# Patient Record
Sex: MALE | Race: WHITE | HISPANIC OR LATINO | Employment: OTHER | URBAN - METROPOLITAN AREA
[De-identification: names, ages, dates, MRNs, and addresses within clinical notes are randomized per-mention and may not be internally consistent; named-entity substitution may affect disease eponyms.]

---

## 2017-05-24 ENCOUNTER — TRANSCRIBE ORDERS (OUTPATIENT)
Dept: LAB | Facility: CLINIC | Age: 42
End: 2017-05-24

## 2017-05-24 ENCOUNTER — APPOINTMENT (OUTPATIENT)
Dept: LAB | Facility: CLINIC | Age: 42
End: 2017-05-24
Payer: MEDICARE

## 2017-05-24 ENCOUNTER — ALLSCRIPTS OFFICE VISIT (OUTPATIENT)
Dept: OTHER | Facility: OTHER | Age: 42
End: 2017-05-24

## 2017-05-24 DIAGNOSIS — Z13.6 ENCOUNTER FOR SCREENING FOR CARDIOVASCULAR DISORDERS: ICD-10-CM

## 2017-05-24 DIAGNOSIS — G62.9 POLYNEUROPATHY: ICD-10-CM

## 2017-05-24 DIAGNOSIS — K62.5 HEMORRHAGE OF ANUS AND RECTUM: ICD-10-CM

## 2017-05-24 DIAGNOSIS — R73.09 OTHER ABNORMAL GLUCOSE: ICD-10-CM

## 2017-05-24 DIAGNOSIS — K92.1 MELENA: ICD-10-CM

## 2017-05-24 LAB
ALBUMIN SERPL BCP-MCNC: 3.8 G/DL (ref 3.5–5)
ALP SERPL-CCNC: 78 U/L (ref 46–116)
ALT SERPL W P-5'-P-CCNC: 26 U/L (ref 12–78)
ANION GAP SERPL CALCULATED.3IONS-SCNC: 6 MMOL/L (ref 4–13)
AST SERPL W P-5'-P-CCNC: 18 U/L (ref 5–45)
BASOPHILS # BLD AUTO: 0.04 THOUSANDS/ΜL (ref 0–0.1)
BASOPHILS NFR BLD AUTO: 1 % (ref 0–1)
BILIRUB SERPL-MCNC: 0.48 MG/DL (ref 0.2–1)
BUN SERPL-MCNC: 12 MG/DL (ref 5–25)
CALCIUM SERPL-MCNC: 9.2 MG/DL (ref 8.3–10.1)
CHLORIDE SERPL-SCNC: 106 MMOL/L (ref 100–108)
CHOLEST SERPL-MCNC: 148 MG/DL (ref 50–200)
CO2 SERPL-SCNC: 30 MMOL/L (ref 21–32)
CREAT SERPL-MCNC: 1.2 MG/DL (ref 0.6–1.3)
EOSINOPHIL # BLD AUTO: 0.17 THOUSAND/ΜL (ref 0–0.61)
EOSINOPHIL NFR BLD AUTO: 2 % (ref 0–6)
ERYTHROCYTE [DISTWIDTH] IN BLOOD BY AUTOMATED COUNT: 13.7 % (ref 11.6–15.1)
EST. AVERAGE GLUCOSE BLD GHB EST-MCNC: 114 MG/DL
FERRITIN SERPL-MCNC: 68 NG/ML (ref 8–388)
GFR SERPL CREATININE-BSD FRML MDRD: >60 ML/MIN/1.73SQ M
GLUCOSE SERPL-MCNC: 74 MG/DL (ref 65–140)
HBA1C MFR BLD: 5.6 % (ref 4.2–6.3)
HCT VFR BLD AUTO: 44.5 % (ref 36.5–49.3)
HDLC SERPL-MCNC: 45 MG/DL (ref 40–60)
HGB BLD-MCNC: 15.2 G/DL (ref 12–17)
IRON SATN MFR SERPL: 30 %
IRON SERPL-MCNC: 96 UG/DL (ref 65–175)
LDLC SERPL CALC-MCNC: 80 MG/DL (ref 0–100)
LYMPHOCYTES # BLD AUTO: 2.24 THOUSANDS/ΜL (ref 0.6–4.47)
LYMPHOCYTES NFR BLD AUTO: 27 % (ref 14–44)
MCH RBC QN AUTO: 31.5 PG (ref 26.8–34.3)
MCHC RBC AUTO-ENTMCNC: 34.2 G/DL (ref 31.4–37.4)
MCV RBC AUTO: 92 FL (ref 82–98)
MONOCYTES # BLD AUTO: 0.57 THOUSAND/ΜL (ref 0.17–1.22)
MONOCYTES NFR BLD AUTO: 7 % (ref 4–12)
NEUTROPHILS # BLD AUTO: 5.38 THOUSANDS/ΜL (ref 1.85–7.62)
NEUTS SEG NFR BLD AUTO: 63 % (ref 43–75)
NRBC BLD AUTO-RTO: 0 /100 WBCS
PLATELET # BLD AUTO: 256 THOUSANDS/UL (ref 149–390)
PMV BLD AUTO: 11.9 FL (ref 8.9–12.7)
POTASSIUM SERPL-SCNC: 3.9 MMOL/L (ref 3.5–5.3)
PROT SERPL-MCNC: 7.2 G/DL (ref 6.4–8.2)
RBC # BLD AUTO: 4.83 MILLION/UL (ref 3.88–5.62)
SODIUM SERPL-SCNC: 142 MMOL/L (ref 136–145)
TIBC SERPL-MCNC: 319 UG/DL (ref 250–450)
TRIGL SERPL-MCNC: 114 MG/DL
TSH SERPL DL<=0.05 MIU/L-ACNC: 2.01 UIU/ML (ref 0.36–3.74)
VIT B12 SERPL-MCNC: 335 PG/ML (ref 100–900)
WBC # BLD AUTO: 8.42 THOUSAND/UL (ref 4.31–10.16)

## 2017-05-24 PROCEDURE — 85025 COMPLETE CBC W/AUTO DIFF WBC: CPT

## 2017-05-24 PROCEDURE — 80061 LIPID PANEL: CPT

## 2017-05-24 PROCEDURE — 80053 COMPREHEN METABOLIC PANEL: CPT

## 2017-05-24 PROCEDURE — 82728 ASSAY OF FERRITIN: CPT

## 2017-05-24 PROCEDURE — 83550 IRON BINDING TEST: CPT

## 2017-05-24 PROCEDURE — 82607 VITAMIN B-12: CPT

## 2017-05-24 PROCEDURE — 83540 ASSAY OF IRON: CPT

## 2017-05-24 PROCEDURE — 83036 HEMOGLOBIN GLYCOSYLATED A1C: CPT

## 2017-05-24 PROCEDURE — 84443 ASSAY THYROID STIM HORMONE: CPT

## 2017-05-24 PROCEDURE — 36415 COLL VENOUS BLD VENIPUNCTURE: CPT

## 2017-06-23 ENCOUNTER — ALLSCRIPTS OFFICE VISIT (OUTPATIENT)
Dept: OTHER | Facility: OTHER | Age: 42
End: 2017-06-23

## 2017-06-30 ENCOUNTER — TRANSCRIBE ORDERS (OUTPATIENT)
Dept: LAB | Facility: CLINIC | Age: 42
End: 2017-06-30

## 2017-06-30 ENCOUNTER — APPOINTMENT (OUTPATIENT)
Dept: LAB | Facility: CLINIC | Age: 42
End: 2017-06-30
Payer: MEDICARE

## 2017-06-30 DIAGNOSIS — K92.1 MELENA: ICD-10-CM

## 2017-06-30 LAB — HEMOCCULT STL QL IA: NEGATIVE

## 2017-06-30 PROCEDURE — G0328 FECAL BLOOD SCRN IMMUNOASSAY: HCPCS

## 2017-07-10 ENCOUNTER — GENERIC CONVERSION - ENCOUNTER (OUTPATIENT)
Dept: OTHER | Facility: OTHER | Age: 42
End: 2017-07-10

## 2017-10-24 ENCOUNTER — ALLSCRIPTS OFFICE VISIT (OUTPATIENT)
Dept: OTHER | Facility: OTHER | Age: 42
End: 2017-10-24

## 2017-10-24 DIAGNOSIS — R20.2 PARESTHESIA OF SKIN: ICD-10-CM

## 2017-10-24 DIAGNOSIS — R20.0 ANESTHESIA OF SKIN: ICD-10-CM

## 2017-10-24 DIAGNOSIS — M25.512 PAIN IN LEFT SHOULDER: ICD-10-CM

## 2017-10-24 DIAGNOSIS — R29.898 OTHER SYMPTOMS AND SIGNS INVOLVING THE MUSCULOSKELETAL SYSTEM: ICD-10-CM

## 2017-10-24 DIAGNOSIS — G62.9 POLYNEUROPATHY: ICD-10-CM

## 2017-10-26 NOTE — PROGRESS NOTES
Assessment  1  Arthralgia of left acromioclavicular joint (719 41) (M25 512)   2  Hematochezia (578 1) (K92 1)   3  Neuropathy, peripheral (356 9) (G62 9)    Plan  Arthralgia of left acromioclavicular joint    · Ibuprofen 800 MG Oral Tablet; take 1 tablet by mouth three times a day if needed   · Pantoprazole Sodium 40 MG Oral Tablet Delayed Release; take one tablet by mouth twice daily   · (1) C-REACTIVE PROTEIN; Status:Active; Requested [id number];    · (1) URIC ACID; Status:Active; Requested [id number];    · * XR SHOULDER 2+ VIEW LEFT; Status:Active; Requested :[id number];    · 1 - Naila SMILEY, Alen Favre (Orthopedic Surgery) Co-Management  *  Status: Active  Requested for:  [id number]  Care Summary provided  : Yes  Neuropathy, peripheral    · (1) BASIC METABOLIC PROFILE; Status:Active; Requested [id number];    · (1) CBC/PLT/DIFF; Status:Active; Requested [id number];     Discussion/Summary  Discussion Summary:   We'll try nonsteroidals orthopedic referral and workup  He'll follow-up here pending the results of his testing or if his symptoms worsen  Medication SE Review and Pt Understands Tx: Possible side effects of new medications were reviewed with the patient/guardian today  The treatment plan was reviewed with the patient/guardian  The patient/guardian understands and agrees with the treatment plan      Chief Complaint  Chief Complaint Free Text Note Form: Left shoulder pain      History of Present Illness  HPI: He has had left shoulder pain for 3 or 4 months  Describes constant aching burning pain in the left trapezius area  Sometimes sharp shooting pain on the top of his left shoulder any movement hurts  It hurts to try to sleep he has not tried any over-the-counter medications  He denies weakness of his arm or numbness but sometimes the pain is severe that he feels aching in his left forearm he has no pain in the neck or headache no shortness of breath palpitations or dizziness   He has no history of any injury to the left shoulder  He is disabled due to electrocution he has some neurologic symptoms of pain in his arms and legs on the right as well as some cognitive problems  Peripheral Neuropathy (Brief): The patient is being seen for a routine clinic follow-up of peripheral neuropathy  Symptoms:  numbness,-- tingling-- and-- upper extremity pain  The patient is currently experiencing symptoms  No associated symptoms are reported  Shoulder Pain: The patient is being seen for an initial evaluation of shoulder pain  Symptoms:  shoulder pain-- and-- decreased range of motion at the shoulder, but-- no localized bruising,-- no localized swelling,-- no localized redness,-- no localized warmth-- and-- no localized weakness  No associated symptoms are reported  Review of Systems  Complete-Male:   Constitutional: No fever or chills, feels well, no tiredness, no recent weight gain or weight loss  Eyes: No complaints of eye pain, no red eyes, no discharge from eyes, no itchy eyes  ENT: no complaints of earache, no hearing loss, no nosebleeds, no nasal discharge, no sore throat, no hoarseness  Cardiovascular: No complaints of slow heart rate, no fast heart rate, no chest pain, no palpitations, no leg claudication, no lower extremity  Respiratory: No complaints of shortness of breath, no wheezing, no cough, no SOB on exertion, no orthopnea or PND  Gastrointestinal: No complaints of abdominal pain, no constipation, no nausea or vomiting, no diarrhea or bloody stools  Genitourinary: No complaints of dysuria, no incontinence, no hesitancy, no nocturia, no genital lesion, no testicular pain  Musculoskeletal: as noted in HPI  Integumentary: No complaints of skin rash or skin lesions, no itching, no skin wound, no dry skin  Neurological: as noted in HPI  Psychiatric: Is not suicidal, no sleep disturbances, no anxiety or depression, no change in personality, no emotional problems  Endocrine: No complaints of proptosis, no hot flashes, no muscle weakness, no erectile dysfunction, no deepening of the voice, no feelings of weakness  Hematologic/Lymphatic: No complaints of swollen glands, no swollen glands in the neck, does not bleed easily, no easy bruising  ROS Reviewed:   ROS reviewed  Active Problems  1  BMI 28 0-28 9,adult (V85 24) (Z68 28)   2  Bright red blood per rectum (569 3) (K62 5)   3  Encounter for screening for cardiovascular disorders (V81 2) (Z13 6)   4  Epigastric pain (789 06) (R10 13)   5  Helicobacter pylori (H  pylori) infection (041 86) (A04 8)   6  Hematochezia (578 1) (K92 1)   7  Neuropathy, peripheral (356 9) (G62 9)    Past Medical History  1  Denied: History of depression   2  Denied: History of substance abuse  Active Problems And Past Medical History Reviewed: The active problems and past medical history were reviewed and updated today  Surgical History  1  History of Foot Repair   2  History of Hand Surgery   3  History of Wrist Surgery  Surgical History Reviewed: The surgical history was reviewed and updated today  Family History  Mother    1  Family history of diabetes mellitus (V18 0) (Z83 3)   2  Denied: Family history of mental disorder   3  Denied: Family history of substance abuse  Father    4  Family history of arthritis (V17 7) (Z82 61)   5  Denied: Family history of mental disorder   6  Denied: Family history of substance abuse  Maternal Grandmother    7  Family history of malignant neoplasm (V16 9) (Z80 9)  Family History Reviewed: The family history was reviewed and updated today  Social History   · Current smoker (305 1) (F17 200)  Social History Reviewed: The social history was reviewed and updated today  Current Meds   1  Clarithromycin 500 MG Oral Tablet; take 1 tablet twice daily for 14 days; Therapy: 92DVZ7348 to (Last Rx:49Jcn1638)  Requested for: 69Gjk7752 Ordered   2   MetroNIDAZOLE 500 MG Oral Tablet; TAKE 1 TABLET 3 TIMES DAILY UNTIL GONE;   Therapy: 08IJP0418 to (Evaluate:51Oew9805)  Requested for: 19CES8736; Last Rx:75Aeu1728   Ordered  Medication List Reviewed: The medication list was reviewed and updated today  Allergies  1  Penicillins  2  No Known Environmental Allergies   3  No Known Food Allergies    Vitals  Vital Signs    Recorded: 53IZE3442 05:40PM   Heart Rate 68   Systolic 095   Diastolic 74   Height 5 ft 8 in   Weight 192 lb 8 oz   BMI Calculated 29 27   BSA Calculated 2 01   O2 Saturation 98     Physical Exam    Constitutional   General appearance: No acute distress, well appearing and well nourished  Eyes   Conjunctiva and lids: No swelling, erythema, or discharge  Pupils and irises: Equal, round and reactive to light  Ears, Nose, Mouth, and Throat   External inspection of ears and nose: Normal     Otoscopic examination: Tympanic membrance translucent with normal light reflex  Canals patent without erythema  Nasal mucosa, septum, and turbinates: Normal without edema or erythema  Oropharynx: Normal with no erythema, edema, exudate or lesions  Pulmonary   Respiratory effort: No increased work of breathing or signs of respiratory distress  Auscultation of lungs: Clear to auscultation, equal breath sounds bilaterally, no wheezes, no rales, no rhonci  Cardiovascular   Palpation of heart: Normal PMI, no thrills  Auscultation of heart: Normal rate and rhythm, normal S1 and S2, without murmurs  Examination of extremities for edema and/or varicosities: Normal     Carotid pulses: Normal     Abdomen   Abdomen: Non-tender, no masses  Liver and spleen: No hepatomegaly or splenomegaly  Lymphatic   Palpation of lymph nodes in neck: No lymphadenopathy  Musculoskeletal   Gait and station: Normal     Digits and nails: Normal without clubbing or cyanosis      Inspection/palpation of joints, bones, and muscles: Abnormal  -- Marked tenderness before meals joint area on the left  Normal range of motion of his neck any movement of his trapezius shoulder blade her left shoulder causes him a lot of pain normal neurovascular function of his hand on the left  Skin   Skin and subcutaneous tissue: Normal without rashes or lesions  Neurologic   Cranial nerves: Cranial nerves 2-12 intact  Reflexes: 2+ and symmetric  Sensation: No sensory loss  Psychiatric   Orientation to person, place and time: Normal     Mood and affect: Normal          Health Management  Health Maintenance   COLONOSCOPY; every 10 years; Last 76EDS9479; Next Due: 69Yku0389;  Active    Signatures   Electronically signed by : Drew Fisher, [facility]; Oct 24 2017  7:18PM EST                       (Author)    Electronically signed by : Yonathan Pinto DO; Oct 25 2017  7:58AM EST                       (Author)

## 2017-11-09 ENCOUNTER — TRANSCRIBE ORDERS (OUTPATIENT)
Dept: ADMINISTRATIVE | Facility: HOSPITAL | Age: 42
End: 2017-11-09

## 2017-11-09 ENCOUNTER — HOSPITAL ENCOUNTER (OUTPATIENT)
Dept: RADIOLOGY | Facility: HOSPITAL | Age: 42
Discharge: HOME | End: 2017-11-09
Payer: MEDICARE

## 2017-11-09 DIAGNOSIS — M25.512 PAIN IN LEFT SHOULDER: ICD-10-CM

## 2017-11-09 PROCEDURE — 73030 X-RAY EXAM OF SHOULDER: CPT

## 2017-11-10 ENCOUNTER — APPOINTMENT (OUTPATIENT)
Dept: LAB | Facility: HOSPITAL | Age: 42
End: 2017-11-10
Payer: MEDICARE

## 2017-11-10 ENCOUNTER — TRANSCRIBE ORDERS (OUTPATIENT)
Dept: ADMINISTRATIVE | Facility: HOSPITAL | Age: 42
End: 2017-11-10

## 2017-11-10 DIAGNOSIS — M25.512 PAIN IN LEFT SHOULDER: ICD-10-CM

## 2017-11-10 DIAGNOSIS — G62.9 POLYNEUROPATHY: ICD-10-CM

## 2017-11-10 LAB
ANION GAP SERPL CALCULATED.3IONS-SCNC: 9 MMOL/L (ref 4–13)
BASOPHILS # BLD AUTO: 0.03 THOUSANDS/ΜL (ref 0–0.1)
BASOPHILS NFR BLD AUTO: 0 % (ref 0–1)
BUN SERPL-MCNC: 17 MG/DL (ref 5–25)
CALCIUM SERPL-MCNC: 9.5 MG/DL (ref 8.3–10.1)
CHLORIDE SERPL-SCNC: 104 MMOL/L (ref 100–108)
CO2 SERPL-SCNC: 29 MMOL/L (ref 21–32)
CREAT SERPL-MCNC: 1.19 MG/DL (ref 0.6–1.3)
CRP SERPL QL: 4.7 MG/L
EOSINOPHIL # BLD AUTO: 0.17 THOUSAND/ΜL (ref 0–0.61)
EOSINOPHIL NFR BLD AUTO: 3 % (ref 0–6)
ERYTHROCYTE [DISTWIDTH] IN BLOOD BY AUTOMATED COUNT: 13 % (ref 11.6–15.1)
GFR SERPL CREATININE-BSD FRML MDRD: 74 ML/MIN/1.73SQ M
GLUCOSE P FAST SERPL-MCNC: 97 MG/DL (ref 65–99)
HCT VFR BLD AUTO: 48.7 % (ref 36.5–49.3)
HGB BLD-MCNC: 16.5 G/DL (ref 12–17)
LYMPHOCYTES # BLD AUTO: 1.67 THOUSANDS/ΜL (ref 0.6–4.47)
LYMPHOCYTES NFR BLD AUTO: 25 % (ref 14–44)
MCH RBC QN AUTO: 30.7 PG (ref 26.8–34.3)
MCHC RBC AUTO-ENTMCNC: 33.9 G/DL (ref 31.4–37.4)
MCV RBC AUTO: 91 FL (ref 82–98)
MONOCYTES # BLD AUTO: 0.59 THOUSAND/ΜL (ref 0.17–1.22)
MONOCYTES NFR BLD AUTO: 9 % (ref 4–12)
NEUTROPHILS # BLD AUTO: 4.36 THOUSANDS/ΜL (ref 1.85–7.62)
NEUTS SEG NFR BLD AUTO: 64 % (ref 43–75)
NRBC BLD AUTO-RTO: 0 /100 WBCS
PLATELET # BLD AUTO: 247 THOUSANDS/UL (ref 149–390)
PMV BLD AUTO: 11.1 FL (ref 8.9–12.7)
POTASSIUM SERPL-SCNC: 4.1 MMOL/L (ref 3.5–5.3)
RBC # BLD AUTO: 5.37 MILLION/UL (ref 3.88–5.62)
SODIUM SERPL-SCNC: 142 MMOL/L (ref 136–145)
URATE SERPL-MCNC: 6 MG/DL (ref 4.2–8)
WBC # BLD AUTO: 6.83 THOUSAND/UL (ref 4.31–10.16)

## 2017-11-10 PROCEDURE — 85025 COMPLETE CBC W/AUTO DIFF WBC: CPT

## 2017-11-10 PROCEDURE — 84550 ASSAY OF BLOOD/URIC ACID: CPT

## 2017-11-10 PROCEDURE — 80048 BASIC METABOLIC PNL TOTAL CA: CPT

## 2017-11-10 PROCEDURE — 36415 COLL VENOUS BLD VENIPUNCTURE: CPT

## 2017-11-10 PROCEDURE — 86140 C-REACTIVE PROTEIN: CPT

## 2017-11-13 ENCOUNTER — TRANSCRIBE ORDERS (OUTPATIENT)
Dept: ADMINISTRATIVE | Facility: HOSPITAL | Age: 42
End: 2017-11-13

## 2017-11-13 ENCOUNTER — GENERIC CONVERSION - ENCOUNTER (OUTPATIENT)
Dept: OTHER | Facility: OTHER | Age: 42
End: 2017-11-13

## 2017-11-13 DIAGNOSIS — M25.512 LEFT SHOULDER PAIN, UNSPECIFIED CHRONICITY: Primary | ICD-10-CM

## 2017-11-18 ENCOUNTER — HOSPITAL ENCOUNTER (OUTPATIENT)
Dept: MRI IMAGING | Facility: HOSPITAL | Age: 42
Discharge: HOME | End: 2017-11-18
Payer: MEDICARE

## 2017-11-18 DIAGNOSIS — M25.512 PAIN IN LEFT SHOULDER: ICD-10-CM

## 2017-11-18 DIAGNOSIS — R20.0 ANESTHESIA OF SKIN: ICD-10-CM

## 2017-11-18 DIAGNOSIS — R29.898 OTHER SYMPTOMS AND SIGNS INVOLVING THE MUSCULOSKELETAL SYSTEM: ICD-10-CM

## 2017-11-18 DIAGNOSIS — R20.2 PARESTHESIA OF SKIN: ICD-10-CM

## 2017-11-18 DIAGNOSIS — G62.9 POLYNEUROPATHY: ICD-10-CM

## 2017-11-18 PROCEDURE — 73221 MRI JOINT UPR EXTREM W/O DYE: CPT

## 2017-12-04 ENCOUNTER — OFFICE VISIT (OUTPATIENT)
Dept: RADIOLOGY | Facility: CLINIC | Age: 42
End: 2017-12-04
Payer: MEDICARE

## 2017-12-04 DIAGNOSIS — M25.512 LEFT SHOULDER PAIN, UNSPECIFIED CHRONICITY: ICD-10-CM

## 2017-12-04 PROCEDURE — 95886 MUSC TEST DONE W/N TEST COMP: CPT

## 2017-12-04 PROCEDURE — 95909 NRV CNDJ TST 5-6 STUDIES: CPT

## 2017-12-11 ENCOUNTER — GENERIC CONVERSION - ENCOUNTER (OUTPATIENT)
Dept: OTHER | Facility: OTHER | Age: 42
End: 2017-12-11

## 2017-12-11 DIAGNOSIS — S46.812A STRAIN OF OTHER MUSCLES, FASCIA AND TENDONS AT SHOULDER AND UPPER ARM LEVEL, LEFT ARM, INITIAL ENCOUNTER: ICD-10-CM

## 2017-12-11 DIAGNOSIS — G56.02 CARPAL TUNNEL SYNDROME OF LEFT WRIST: ICD-10-CM

## 2017-12-11 DIAGNOSIS — S43.432A SUPERIOR GLENOID LABRUM LESION OF LEFT SHOULDER: ICD-10-CM

## 2017-12-20 ENCOUNTER — ALLSCRIPTS OFFICE VISIT (OUTPATIENT)
Dept: OTHER | Facility: OTHER | Age: 42
End: 2017-12-20

## 2017-12-21 NOTE — PROGRESS NOTES
Assessment   1  Incomplete tear of left rotator cuff (840 4) (M75 112)   2  Superior glenoid labrum lesion of left shoulder, initial encounter (840 7) (T32 456U)   3  Arthrosis of left acromioclavicular joint (715 91) (M19 012)    Discussion/Summary      Left shoulder partial-thickness tear supraspinatus tendon, SLAP tear with medial subluxation biceps tendon, and acromioclavicular joint arthrosis  treatment options with the patient, including continued conservative treatment versus arthroscopic rotator cuff repair, biceps tenodesis, and distal clavicle excision  Risks and benefits of surgery were explained, including potential for persistent symptoms, failure to heal, or stiffness  Patient understands wishes to proceed with surgery  Explained need for 4-6 weeks of sling immobilization and extensive physical therapy following surgery  up 10-14 days after surgery  The risks and benefits of surgery were reviewed with the patient/guardian inclusive of but not limited to infection, failure to alleviate discomfort, failure of procedure, nerve injury, stiffness, blood clots and need for further surgery    Patient is able to Self-Care  The treatment plan was reviewed with the patient/guardian  The patient/guardian understands and agrees with the treatment plan       Self Referrals: No Dr Marisol Montenegro      Chief Complaint   1  Shoulder Pain    History of Present Illness   43-year-old male presents with left shoulder pain that started suddenly in August 2017 without specific injury  He believes that he âslept wrongâ and has had generalized pain in his left shoulder that is severe at times  Symptoms are worse at night or with increased use of his left arm  He describes occasional numbness and tingling in his left hand  Prior workup includes MRI of the left shoulder and an EMG of the left upper extremity  He was referred by Dr Marisol Montenegro for treatment recommendations regarding his left shoulder problem     past medical history, past surgical history, medications, allergies, social history, and family history were reviewed and updated today  Review of Systems        Constitutional: No fever or chills, feels well, no tiredness, no recent weight loss or weight gain  Eyes: No complaints of red eyes, no eyesight problems  ENT: no complaints of loss of hearing, no nosebleeds, no sore throat  Cardiovascular: No complaints of chest pain, no palpitations, no leg claudication or lower extremity edema  Respiratory: No complaints of shortness of breath, no wheezing, no cough  Gastrointestinal: No complaints of abdominal pain, no constipation, no nausea or vomiting, no diarrhea or bloody stools  Genitourinary: No complaints of dysuria or incontinence, no hesitancy, no nocturia  Musculoskeletal: as noted in HPI  Integumentary: No complaints of skin rash or lesion, no itching or dry skin, no skin wounds  Neurological: No complaints of headache, no confusion, no numbness or tingling, no dizziness  Psychiatric: No suicidal thoughts, no anxiety, no depression  Endocrine: No muscle weakness, no frequent urination, no excessive thirst, no feelings of weakness  Active Problems   1  Arthrosis of left acromioclavicular joint (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 91) (M19 012)   2  BMI 28 0-28 9,adult (V85 24) (Z68 28)   3  Bright red blood per rectum (569 3) (K62 5)   4  Carpal tunnel syndrome, left (354 0) (G56 02)   5  Chronic left shoulder pain (719 41,338 29) (M25 512,G89 29)   6  Complex regional pain syndrome type 1 of left upper extremity (337 21) (G90 512)   7  Encounter for screening for cardiovascular disorders (V81 2) (Z13 6)   8  Epigastric pain (789 06) (R10 13)   9  Helicobacter pylori (H  pylori) infection (041 86) (A04 8)   10  Hematochezia (578 1) (K92 1)   11  Incomplete tear of left rotator cuff (840 4) (M75 112)   12  Neuropathy, peripheral (356 9) (G62 9)   13   Numbness and tingling in left arm (782  0) (R20 0,R20 2)   14  Shoulder weakness (719 61) (R29 898)   15  Superior glenoid labrum lesion of left shoulder, initial encounter (840 7) (F09 814I)    Past Medical History    · Denied: History of depression   · Denied: History of substance abuse    Surgical History    · History of Foot Repair   · History of Hand Surgery   · History of Wrist Surgery    Family History   Mother    · Family history of diabetes mellitus (V18 0) (Z83 3)   · Denied: Family history of mental disorder   · Denied: Family history of substance abuse  Father    · Family history of arthritis (V17 7) (Z82 61)   · Denied: Family history of mental disorder   · Denied: Family history of substance abuse  Maternal Grandmother    · Family history of malignant neoplasm (V16 9) (Z80 9)    Social History    · Current smoker (305 1) (F17 200)    Current Meds    1  Gabapentin 300 MG Oral Capsule; One tab by mouth each bedtime x3 days, if there is     no significant drowsiness, then increase to one tab by mouth 3 times a day; Therapy: 68HCJ9745 to (Last Rx:13Nov2017)  Requested for: 86GRL7773 Ordered   2  Ibuprofen 800 MG Oral Tablet; take 1 tablet by mouth three times a day if needed; Therapy: 59POL1997 to (Adrienne Overton)  Requested for: [phone]; Last     Rx:24Oct2017 Ordered   3  Pantoprazole Sodium 40 MG Oral Tablet Delayed Release; take one tablet by mouth     twice daily; Therapy: 87BWG9644 to (Evaluate:89Jas4593)  Requested for: 07Pln6976; Last     Rx:71Wct9096 Ordered   4  PredniSONE 20 MG Oral Tablet; TAKE 1 TABLET DAILY x5 days then 1/2 tab daily x2     days; Therapy: 17HVD4444 to (Last Rx:13Nov2017)  Requested for: 42VMY3595 Ordered    Allergies   1  Penicillins  2  No Known Environmental Allergies   3   No Known Food Allergies    Vitals    Recorded: 20Dec2017 09:45AM   Heart Rate 81   Systolic 762   Diastolic 63   Height 5 ft 8 in   Weight 196 lb 4 00 oz   BMI Calculated 29 84   BSA Calculated 2 03     Physical Exam Left shoulder:  Tenderness to palpation diffusely  Range of motion is forward flexion 90Â° limited by guarding/pain, external rotation-abduction 70Â° limited by pain, and internal rotation-abduction 10Â° limited by pain  Badillo impingement sign is positive  Empty can test is positive  Speed's test is positive  O'paresh's test is equivocal   Cross-body adduction test is positive  4/5 strength globally in the left shoulder limited by pain  Constitutional - General appearance: Normal        Eyes - Conjunctiva and lids: Normal       Cardiovascular - Pulses: Normal      Lungs - Respiratory effort: Normal      Skin - Skin and subcutaneous tissue: Normal       Neurologic - Sensation: Normal       Psychiatric - Mood and affect: Normal          Results/Data   I personally reviewed the films/images/results in the office today  My interpretation follows  MRI Review Left shoulder 12/10/17: High-grade partial tear of the supraspinatus tendon  SLAP tear with mild medial subluxation of the long head of the biceps tendon  Mild subacromial bursitis  Moderate degenerative changes in the acromioclavicular joint with extensive subchondral bone marrow edema  Diagnostic Review EMG 12/4/17: Moderate median neuropathy at the wrist       Future Appointments      Date/Time Provider Specialty Site   01/25/2018 09:45 AM SALVADOR Lopez  [address] [place] [address]   02/07/2018 10:30 AM Mauricio Lara [facility]  [place] [address]   02/07/2018 08:00 AM SALVADOR Kimball   Orthopedic [address] [place] [address]     Signatures    Electronically signed by : SALVADOR Bello ; Dec 20 2017 12:38PM EST                       (Author)

## 2018-01-10 RX ORDER — IBUPROFEN 800 MG/1
1 TABLET ORAL 3 TIMES DAILY PRN
COMMUNITY
Start: 2017-10-24 | End: 2018-02-06

## 2018-01-10 RX ORDER — GABAPENTIN 300 MG/1
CAPSULE ORAL
COMMUNITY
Start: 2017-11-13 | End: 2018-02-06

## 2018-01-10 RX ORDER — PREDNISONE 20 MG/1
TABLET ORAL
COMMUNITY
Start: 2017-11-13 | End: 2018-02-06

## 2018-01-10 NOTE — PRE-PROCEDURE INSTRUCTIONS
Pre-Surgery Instructions:   Medication Instructions    gabapentin (NEURONTIN) 300 mg capsule Instructed patient per Anesthesia Guidelines   ibuprofen (MOTRIN) 800 mg tablet Instructed patient per Anesthesia Guidelines   predniSONE 20 mg tablet Instructed patient per Anesthesia Guidelines      Bathing and pre-op instructions given

## 2018-01-12 VITALS
OXYGEN SATURATION: 98 % | HEART RATE: 68 BPM | HEIGHT: 68 IN | BODY MASS INDEX: 29.18 KG/M2 | DIASTOLIC BLOOD PRESSURE: 74 MMHG | SYSTOLIC BLOOD PRESSURE: 100 MMHG | WEIGHT: 192.5 LBS

## 2018-01-13 VITALS
HEIGHT: 68 IN | HEART RATE: 82 BPM | WEIGHT: 189 LBS | SYSTOLIC BLOOD PRESSURE: 132 MMHG | DIASTOLIC BLOOD PRESSURE: 72 MMHG | OXYGEN SATURATION: 96 % | BODY MASS INDEX: 28.64 KG/M2

## 2018-01-14 VITALS
BODY MASS INDEX: 28.57 KG/M2 | HEIGHT: 68 IN | WEIGHT: 188.5 LBS | DIASTOLIC BLOOD PRESSURE: 72 MMHG | SYSTOLIC BLOOD PRESSURE: 128 MMHG

## 2018-01-15 ENCOUNTER — ANESTHESIA EVENT (OUTPATIENT)
Dept: PERIOP | Facility: AMBULARY SURGERY CENTER | Age: 43
End: 2018-01-15
Payer: MEDICARE

## 2018-01-16 ENCOUNTER — APPOINTMENT (OUTPATIENT)
Dept: LAB | Facility: CLINIC | Age: 43
End: 2018-01-16
Payer: MEDICARE

## 2018-01-16 ENCOUNTER — TRANSCRIBE ORDERS (OUTPATIENT)
Dept: LAB | Facility: CLINIC | Age: 43
End: 2018-01-16

## 2018-01-16 DIAGNOSIS — S46.812A STRAIN OF LEFT TRAPEZIUS MUSCLE, INITIAL ENCOUNTER: ICD-10-CM

## 2018-01-16 DIAGNOSIS — S46.812A STRAIN OF LEFT TRAPEZIUS MUSCLE, INITIAL ENCOUNTER: Primary | ICD-10-CM

## 2018-01-16 LAB
ANION GAP SERPL CALCULATED.3IONS-SCNC: 6 MMOL/L (ref 4–13)
BUN SERPL-MCNC: 23 MG/DL (ref 5–25)
CALCIUM SERPL-MCNC: 8.8 MG/DL (ref 8.3–10.1)
CHLORIDE SERPL-SCNC: 103 MMOL/L (ref 100–108)
CO2 SERPL-SCNC: 29 MMOL/L (ref 21–32)
CREAT SERPL-MCNC: 1.25 MG/DL (ref 0.6–1.3)
GFR SERPL CREATININE-BSD FRML MDRD: 70 ML/MIN/1.73SQ M
GLUCOSE SERPL-MCNC: 92 MG/DL (ref 65–140)
POTASSIUM SERPL-SCNC: 3.8 MMOL/L (ref 3.5–5.3)
SODIUM SERPL-SCNC: 138 MMOL/L (ref 136–145)

## 2018-01-16 PROCEDURE — 36415 COLL VENOUS BLD VENIPUNCTURE: CPT

## 2018-01-16 PROCEDURE — 80048 BASIC METABOLIC PNL TOTAL CA: CPT

## 2018-01-22 VITALS
HEIGHT: 68 IN | DIASTOLIC BLOOD PRESSURE: 82 MMHG | SYSTOLIC BLOOD PRESSURE: 120 MMHG | WEIGHT: 189 LBS | BODY MASS INDEX: 28.64 KG/M2 | HEART RATE: 103 BPM

## 2018-01-23 VITALS
SYSTOLIC BLOOD PRESSURE: 107 MMHG | HEIGHT: 68 IN | BODY MASS INDEX: 29.74 KG/M2 | DIASTOLIC BLOOD PRESSURE: 63 MMHG | WEIGHT: 196.25 LBS | HEART RATE: 81 BPM

## 2018-01-24 VITALS
BODY MASS INDEX: 28.64 KG/M2 | WEIGHT: 189 LBS | HEIGHT: 68 IN | HEART RATE: 101 BPM | SYSTOLIC BLOOD PRESSURE: 102 MMHG | DIASTOLIC BLOOD PRESSURE: 69 MMHG

## 2018-01-25 ENCOUNTER — HOSPITAL ENCOUNTER (OUTPATIENT)
Facility: AMBULARY SURGERY CENTER | Age: 43
Setting detail: OUTPATIENT SURGERY
Discharge: HOME | End: 2018-01-25
Attending: ORTHOPAEDIC SURGERY | Admitting: ORTHOPAEDIC SURGERY
Payer: MEDICARE

## 2018-01-25 ENCOUNTER — ANESTHESIA (OUTPATIENT)
Dept: PERIOP | Facility: AMBULARY SURGERY CENTER | Age: 43
End: 2018-01-25
Payer: MEDICARE

## 2018-01-25 VITALS
RESPIRATION RATE: 18 BRPM | OXYGEN SATURATION: 97 % | WEIGHT: 197 LBS | TEMPERATURE: 97.8 F | SYSTOLIC BLOOD PRESSURE: 107 MMHG | BODY MASS INDEX: 28.2 KG/M2 | DIASTOLIC BLOOD PRESSURE: 63 MMHG | HEART RATE: 73 BPM | HEIGHT: 70 IN

## 2018-01-25 DIAGNOSIS — S43.432A SUPERIOR GLENOID LABRUM LESION OF LEFT SHOULDER, INITIAL ENCOUNTER: Primary | ICD-10-CM

## 2018-01-25 DIAGNOSIS — M75.112 INCOMPLETE TEAR OF LEFT ROTATOR CUFF: ICD-10-CM

## 2018-01-25 DIAGNOSIS — M19.012 ARTHROSIS OF LEFT ACROMIOCLAVICULAR JOINT: ICD-10-CM

## 2018-01-25 PROBLEM — M75.42 IMPINGEMENT SYNDROME OF LEFT SHOULDER: Status: ACTIVE | Noted: 2018-01-25

## 2018-01-25 PROBLEM — A04.8 HELICOBACTER PYLORI (H. PYLORI) INFECTION: Status: ACTIVE | Noted: 2017-07-13

## 2018-01-25 PROBLEM — K92.1 HEMATOCHEZIA: Status: ACTIVE | Noted: 2017-06-23

## 2018-01-25 PROBLEM — G56.02 CARPAL TUNNEL SYNDROME, LEFT: Status: ACTIVE | Noted: 2017-12-04

## 2018-01-25 PROBLEM — G90.512 COMPLEX REGIONAL PAIN SYNDROME TYPE 1 OF LEFT UPPER EXTREMITY: Status: ACTIVE | Noted: 2017-11-13

## 2018-01-25 PROCEDURE — 29826 SHO ARTHRS SRG DECOMPRESSION: CPT | Performed by: ORTHOPAEDIC SURGERY

## 2018-01-25 PROCEDURE — 29824 SHO ARTHRS SRG DSTL CLAVICLC: CPT | Performed by: ORTHOPAEDIC SURGERY

## 2018-01-25 PROCEDURE — 29807 SHO ARTHRS SRG RPR SLAP LES: CPT | Performed by: ORTHOPAEDIC SURGERY

## 2018-01-25 PROCEDURE — C1713 ANCHOR/SCREW BN/BN,TIS/BN: HCPCS | Performed by: ORTHOPAEDIC SURGERY

## 2018-01-25 DEVICE — ANCHOR GRYPHON W ORTHOCORD 210813: Type: IMPLANTABLE DEVICE | Site: SHOULDER | Status: FUNCTIONAL

## 2018-01-25 RX ORDER — FENTANYL CITRATE 50 UG/ML
INJECTION, SOLUTION INTRAMUSCULAR; INTRAVENOUS AS NEEDED
Status: DISCONTINUED | OUTPATIENT
Start: 2018-01-25 | End: 2018-01-25 | Stop reason: SURG

## 2018-01-25 RX ORDER — SODIUM CHLORIDE, SODIUM LACTATE, POTASSIUM CHLORIDE, CALCIUM CHLORIDE 600; 310; 30; 20 MG/100ML; MG/100ML; MG/100ML; MG/100ML
125 INJECTION, SOLUTION INTRAVENOUS CONTINUOUS
Status: DISCONTINUED | OUTPATIENT
Start: 2018-01-25 | End: 2018-01-25 | Stop reason: HOSPADM

## 2018-01-25 RX ORDER — ROPIVACAINE HYDROCHLORIDE 5 MG/ML
INJECTION, SOLUTION EPIDURAL; INFILTRATION; PERINEURAL AS NEEDED
Status: DISCONTINUED | OUTPATIENT
Start: 2018-01-25 | End: 2018-01-25 | Stop reason: SURG

## 2018-01-25 RX ORDER — ROCURONIUM BROMIDE 10 MG/ML
INJECTION, SOLUTION INTRAVENOUS AS NEEDED
Status: DISCONTINUED | OUTPATIENT
Start: 2018-01-25 | End: 2018-01-25 | Stop reason: SURG

## 2018-01-25 RX ORDER — OXYCODONE HYDROCHLORIDE 5 MG/1
TABLET ORAL
Qty: 60 TABLET | Refills: 0 | Status: SHIPPED | OUTPATIENT
Start: 2018-01-25 | End: 2018-02-07

## 2018-01-25 RX ORDER — MORPHINE SULFATE 2 MG/ML
2 INJECTION, SOLUTION INTRAMUSCULAR; INTRAVENOUS EVERY 2 HOUR PRN
Status: DISCONTINUED | OUTPATIENT
Start: 2018-01-25 | End: 2018-01-25 | Stop reason: HOSPADM

## 2018-01-25 RX ORDER — ONDANSETRON 2 MG/ML
INJECTION INTRAMUSCULAR; INTRAVENOUS AS NEEDED
Status: DISCONTINUED | OUTPATIENT
Start: 2018-01-25 | End: 2018-01-25 | Stop reason: SURG

## 2018-01-25 RX ORDER — ONDANSETRON 2 MG/ML
4 INJECTION INTRAMUSCULAR; INTRAVENOUS EVERY 6 HOURS PRN
Status: DISCONTINUED | OUTPATIENT
Start: 2018-01-25 | End: 2018-01-25 | Stop reason: HOSPADM

## 2018-01-25 RX ORDER — ACETAMINOPHEN 325 MG/1
650 TABLET ORAL EVERY 4 HOURS PRN
Status: DISCONTINUED | OUTPATIENT
Start: 2018-01-25 | End: 2018-01-25 | Stop reason: HOSPADM

## 2018-01-25 RX ORDER — KETOROLAC TROMETHAMINE 30 MG/ML
INJECTION, SOLUTION INTRAMUSCULAR; INTRAVENOUS AS NEEDED
Status: DISCONTINUED | OUTPATIENT
Start: 2018-01-25 | End: 2018-01-25 | Stop reason: SURG

## 2018-01-25 RX ORDER — EPHEDRINE SULFATE 50 MG/ML
INJECTION, SOLUTION INTRAVENOUS AS NEEDED
Status: DISCONTINUED | OUTPATIENT
Start: 2018-01-25 | End: 2018-01-25 | Stop reason: SURG

## 2018-01-25 RX ORDER — DIPHENHYDRAMINE HYDROCHLORIDE 50 MG/ML
12.5 INJECTION INTRAMUSCULAR; INTRAVENOUS ONCE AS NEEDED
Status: DISCONTINUED | OUTPATIENT
Start: 2018-01-25 | End: 2018-01-25 | Stop reason: HOSPADM

## 2018-01-25 RX ORDER — ALBUTEROL SULFATE 2.5 MG/3ML
2.5 SOLUTION RESPIRATORY (INHALATION) ONCE AS NEEDED
Status: DISCONTINUED | OUTPATIENT
Start: 2018-01-25 | End: 2018-01-25 | Stop reason: HOSPADM

## 2018-01-25 RX ORDER — PROPOFOL 10 MG/ML
INJECTION, EMULSION INTRAVENOUS AS NEEDED
Status: DISCONTINUED | OUTPATIENT
Start: 2018-01-25 | End: 2018-01-25 | Stop reason: SURG

## 2018-01-25 RX ORDER — GLYCOPYRROLATE 0.2 MG/ML
INJECTION INTRAMUSCULAR; INTRAVENOUS AS NEEDED
Status: DISCONTINUED | OUTPATIENT
Start: 2018-01-25 | End: 2018-01-25 | Stop reason: SURG

## 2018-01-25 RX ORDER — OXYCODONE HYDROCHLORIDE 5 MG/1
10 TABLET ORAL
Status: DISCONTINUED | OUTPATIENT
Start: 2018-01-25 | End: 2018-01-25 | Stop reason: HOSPADM

## 2018-01-25 RX ORDER — MIDAZOLAM HYDROCHLORIDE 1 MG/ML
INJECTION INTRAMUSCULAR; INTRAVENOUS AS NEEDED
Status: DISCONTINUED | OUTPATIENT
Start: 2018-01-25 | End: 2018-01-25 | Stop reason: SURG

## 2018-01-25 RX ORDER — FENTANYL CITRATE/PF 50 MCG/ML
25 SYRINGE (ML) INJECTION
Status: DISCONTINUED | OUTPATIENT
Start: 2018-01-25 | End: 2018-01-25 | Stop reason: HOSPADM

## 2018-01-25 RX ORDER — DEXMEDETOMIDINE HYDROCHLORIDE 100 UG/ML
INJECTION, SOLUTION INTRAVENOUS AS NEEDED
Status: DISCONTINUED | OUTPATIENT
Start: 2018-01-25 | End: 2018-01-25 | Stop reason: SURG

## 2018-01-25 RX ORDER — METOCLOPRAMIDE HYDROCHLORIDE 5 MG/ML
10 INJECTION INTRAMUSCULAR; INTRAVENOUS ONCE AS NEEDED
Status: DISCONTINUED | OUTPATIENT
Start: 2018-01-25 | End: 2018-01-25 | Stop reason: HOSPADM

## 2018-01-25 RX ADMIN — NEOSTIGMINE METHYLSULFATE 3 MG: 1 INJECTION, SOLUTION INTRAMUSCULAR; INTRAVENOUS; SUBCUTANEOUS at 12:07

## 2018-01-25 RX ADMIN — KETOROLAC TROMETHAMINE 30 MG: 30 INJECTION, SOLUTION INTRAMUSCULAR at 11:59

## 2018-01-25 RX ADMIN — OXYCODONE HYDROCHLORIDE 10 MG: 5 TABLET ORAL at 12:58

## 2018-01-25 RX ADMIN — ONDANSETRON 4 MG: 2 INJECTION INTRAMUSCULAR; INTRAVENOUS at 09:54

## 2018-01-25 RX ADMIN — FENTANYL CITRATE 100 MCG: 50 INJECTION, SOLUTION INTRAMUSCULAR; INTRAVENOUS at 09:25

## 2018-01-25 RX ADMIN — PROPOFOL 200 MG: 10 INJECTION, EMULSION INTRAVENOUS at 10:01

## 2018-01-25 RX ADMIN — GLYCOPYRROLATE 0.4 MG: 0.2 INJECTION, SOLUTION INTRAMUSCULAR; INTRAVENOUS at 12:07

## 2018-01-25 RX ADMIN — ROPIVACAINE HYDROCHLORIDE 15 ML: 5 INJECTION, SOLUTION EPIDURAL; INFILTRATION; PERINEURAL at 09:28

## 2018-01-25 RX ADMIN — DEXAMETHASONE SODIUM PHOSPHATE 10 MG: 10 INJECTION INTRAMUSCULAR; INTRAVENOUS at 10:05

## 2018-01-25 RX ADMIN — DEXMEDETOMIDINE HYDROCHLORIDE 45 MCG: 100 INJECTION, SOLUTION INTRAVENOUS at 09:28

## 2018-01-25 RX ADMIN — EPHEDRINE SULFATE 5 MG: 50 INJECTION, SOLUTION INTRAMUSCULAR; INTRAVENOUS; SUBCUTANEOUS at 10:45

## 2018-01-25 RX ADMIN — ACETAMINOPHEN 650 MG: 325 TABLET ORAL at 12:58

## 2018-01-25 RX ADMIN — EPHEDRINE SULFATE 5 MG: 50 INJECTION, SOLUTION INTRAMUSCULAR; INTRAVENOUS; SUBCUTANEOUS at 11:03

## 2018-01-25 RX ADMIN — ONDANSETRON 4 MG: 2 INJECTION INTRAMUSCULAR; INTRAVENOUS at 11:59

## 2018-01-25 RX ADMIN — SODIUM CHLORIDE, SODIUM LACTATE, POTASSIUM CHLORIDE, AND CALCIUM CHLORIDE: .6; .31; .03; .02 INJECTION, SOLUTION INTRAVENOUS at 09:21

## 2018-01-25 RX ADMIN — ROCURONIUM BROMIDE 30 MG: 10 INJECTION INTRAVENOUS at 10:01

## 2018-01-25 RX ADMIN — EPHEDRINE SULFATE 5 MG: 50 INJECTION, SOLUTION INTRAMUSCULAR; INTRAVENOUS; SUBCUTANEOUS at 10:34

## 2018-01-25 RX ADMIN — MIDAZOLAM HYDROCHLORIDE 2 MG: 1 INJECTION, SOLUTION INTRAMUSCULAR; INTRAVENOUS at 09:25

## 2018-01-25 RX ADMIN — CEFAZOLIN SODIUM 2000 MG: 2 SOLUTION INTRAVENOUS at 09:57

## 2018-01-25 NOTE — ANESTHESIA PREPROCEDURE EVALUATION
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) ,    Psychology   Negative psychology ROS            Physical Exam    Airway    Mallampati score: I  TM Distance: >3 FB  Neck ROM: full     Dental   No notable dental hx     Cardiovascular  Comment: Negative ROS,     Pulmonary      Other Findings       Lab Results   Component Value Date    WBC 6 83 11/10/2017    HGB 16 5 11/10/2017     11/10/2017     Lab Results   Component Value Date     01/16/2018    K 3 8 01/16/2018    BUN 23 01/16/2018    CREATININE 1 25 01/16/2018    GLUCOSE 92 01/16/2018     Lab Results   Component Value Date    HGBA1C 5 6 05/24/2017     Anesthesia Plan  ASA Score- 2     Anesthesia Type- general and regional with ASA Monitors  Additional Monitors:   Airway Plan: ETT  Comment: GETA + IS block for post op pain control  Plan Factors-    Induction- intravenous  Postoperative Plan-     Informed Consent- Anesthetic plan and risks discussed with patient (friend)  I personally reviewed this patient with the CRNA  Discussed and agreed on the Anesthesia Plan with the CRNA  David Hopkins

## 2018-01-25 NOTE — OP NOTE
OPERATIVE REPORT  PATIENT NAME: Davi Terry    :  1975  MRN: [id number]  Pt Location: AN  OR ROOM 06    SURGERY DATE: 2018    Surgeon(s) and Role:     * Tommi Boas, MD - Primary    Pre-Op Diagnosis Codes:     * Incomplete tear of left rotator cuff [M75 112]     * Superior glenoid labrum lesion of left shoulder [S43 432A]     * Arthrosis of left acromioclavicular joint [M19 012]    Post-Op Diagnosis Codes:     * Incomplete tear of left rotator cuff [M75 112]     * Superior glenoid labrum lesion of left shoulder [S43 432A]     * Impingement syndrome of left shoulder [M75 42]     * Arthrosis of left acromioclavicular joint [M19 012]    Procedure(s) (LRB):  LEFT SHOULDER ARTHROSCOPIC SLAP REPAIR, ROTATOR CUFF DEBRIDEMENT, SUBACROMIAL DECOMPRESSION, DISTAL CLAVICLE EXCISION    Specimen(s):  None    Estimated Blood Loss:   Minimal    Drains:  None    Anesthesia Type:   General w/ Regional    Complications:   None    Procedure and Technique:  The patient was identified in the preoperative holding area, and the surgical site was marked by the surgeon  Regional anesthesia was administered by the anesthesiologist in the holding area  The patient was transported to the operating room and placed in the supine position on the OR table  General anesthesia was administered  The patient was then placed in the beach chair position  The left shoulder was prepped and draped in the usual sterile fashion  Prophylactic antibiotics were given within 1 hour of incision  Following a surgical timeout, a posterior arthroscopy portal was established with a 15 blade scalpel, and the 4 mm 30° arthroscopic camera was placed in the glenohumeral joint  An anterior portal was established under direct visualization with the aid of a spinal needle  The superior glenoid labrum demonstrated a type 2 SLAP tear   The intra-articular portion of the long head of the biceps tendon demonstrated mild hypertrophic changes but was otherwise intact  The subscapularis tendon was intact  The remainder of the glenoid labrum was intact  The articular surfaces of the glenoid and humeral head were intact  The supraspinatus tendon demonstrated a partial-thickness articular sided tear involving approximately 20% of the tendon thickness  The infraspinatus and teres minor tendons were intact  An anteroinferior arthroscopy portal was established along the superior border of the subscapularis tendon  The labrum was elevated from the glenoid rim at the tear site, and the glenoid rim was abraded with the 4 0 mm full-radius resector  A double-loaded Mitek Gryphon suture anchor was placed in the glenoid rim  The sutures were passed with the penetrating grasper and secured using arthroscopic knot tying techniques  A stable SLAP repair was confirmed with the arthroscopic probe  Attention was turned to the subacromial space  A lateral arthroscopy portal was established under direct visualization with the aid of a spinal needle  Severe subacromial bursitis was present, and the undersurface of the acromion demonstrated a significant anterior subacromial spur consistent with impingement syndrome  The bursal surface of the rotator cuff was intact throughout  Partial acromioplasty was performed with the 4 0 mm haile for a subacromial decompression  The distal clavicle was exposed and demonstrated severe degenerative changes  Distal clavicle excision was performed with the 4 0 mm haile, excising approximately 8 mm of distal clavicle  The shoulder was irrigated extensively with arthroscopic irrigation fluid  The portal sites were closed with simple buried 4-0 Monocryl sutures  Steri-Strips were applied  The wounds were dressed with sterile 4 x 4 gauze, ABDs, and foam tape  The operative arm was immobilized in a sling  Anesthesia was reversed, and the patient was extubated  The patient was transferred to the recovery area in stable condition      The assistance of an advance practitioner was necessary for sterile draping, placement of suture anchors, and holding the arthroscopic camera during knot tying  A qualified resident physician was not available      Patient Disposition:  PACU  and extubated and stable    SIGNATURE: Mook Carlisle MD  DATE: January 25, 2018  TIME: 12:01 PM

## 2018-01-25 NOTE — ANESTHESIA PROCEDURE NOTES
Peripheral Block    Patient location during procedure: holding area  Start time: 1/25/2018 9:28 AM  Reason for block: at surgeon's request and post-op pain management  Staffing  Anesthesiologist: Mustapha Smith  Performed: anesthesiologist   Preanesthetic Checklist  Completed: patient identified, site marked, surgical consent, pre-op evaluation, timeout performed, IV checked, risks and benefits discussed and monitors and equipment checked  Peripheral Block  Patient position: sitting  Prep: ChloraPrep  Patient monitoring: heart rate, cardiac monitor, continuous pulse ox and frequent blood pressure checks  Block type: interscalene  Laterality: left  Injection technique: single-shot  Procedures: ultrasound guided  ultrasound permanent image saved    Local infiltration: ropivacaine (with epi 1:400K + dexmedetomidine 45 mcg)  Infiltration strength: 0 5 %  Dose: 15 mL  Needle  Needle type: Stimuplex   Needle gauge: 22 G  Needle length: 5 cm  Needle localization: ultrasound guidance  Assessment  Injection assessment: incremental injection, local visualized surrounding nerve on ultrasound, no paresthesia on injection and negative aspiration for heme  Paresthesia pain: none  Heart rate change: no  Slow fractionated injection: yes  Post-procedure:  site cleaned  patient tolerated the procedure well with no immediate complications

## 2018-01-25 NOTE — ANESTHESIA POSTPROCEDURE EVALUATION
Post-Op Assessment Note      CV Status:  Stable    Mental Status:  Alert and awake    Hydration Status:  Euvolemic    PONV Controlled:  Controlled    Airway Patency:  Patent    Post Op Vitals Reviewed: Yes          Staff: CRNA           BP   121/65   Temp 97 1   Pulse 78   Resp 16   SpO2 91

## 2018-01-25 NOTE — H&P
Assessment   Incomplete tear of left rotator cuff  Superior glenoid labrum lesion of left shoulder  Arthrosis of left acromioclavicular joint     Discussion/Summary    Left shoulder partial-thickness tear supraspinatus tendon, SLAP tear with medial subluxation biceps tendon, and acromioclavicular joint arthrosis  Reviewed treatment options with the patient, including continued conservative treatment versus arthroscopic rotator cuff repair, biceps tenodesis, and distal clavicle excision  Risks and benefits of surgery were explained, including potential for persistent symptoms, failure to heal, or stiffness  Patient understands wishes to proceed with surgery  Explained need for 4-6 weeks of sling immobilization and extensive physical therapy following surgery  Follow up 10-14 days after surgery  Chief Complaint  1  Shoulder Pain    History of Present Illness  75-year-old male presents with left shoulder pain that started suddenly in August 2017 without specific injury  He believes that he slept wrong and has had generalized pain in his left shoulder that is severe at times  Symptoms are worse at night or with increased use of his left arm  He describes occasional numbness and tingling in his left hand  Prior workup includes MRI of the left shoulder and an EMG of the left upper extremity  He was referred by Dr Elle Barr for treatment recommendations regarding his left shoulder problem  The past medical history, past surgical history, medications, allergies, social history, and family history were reviewed and updated today  Review of Systems    Constitutional: No fever or chills, feels well, no tiredness, no recent weight loss or weight gain  Eyes: No complaints of red eyes, no eyesight problems  ENT: no complaints of loss of hearing, no nosebleeds, no sore throat  Cardiovascular: No complaints of chest pain, no palpitations, no leg claudication or lower extremity edema     Respiratory: No complaints of shortness of breath, no wheezing, no cough  Gastrointestinal: No complaints of abdominal pain, no constipation, no nausea or vomiting, no diarrhea or bloody stools  Genitourinary: No complaints of dysuria or incontinence, no hesitancy, no nocturia  Musculoskeletal: as noted in HPI  Integumentary: No complaints of skin rash or lesion, no itching or dry skin, no skin wounds  Neurological: No complaints of headache, no confusion, no numbness or tingling, no dizziness  Psychiatric: No suicidal thoughts, no anxiety, no depression  Endocrine: No muscle weakness, no frequent urination, no excessive thirst, no feelings of weakness  Active Problems  1  Arthrosis of left acromioclavicular joint (715 91) (M19 012)  2  BMI 28 0-28 9,adult (V85 24) (Z68 28)  3  Bright red blood per rectum (569 3) (K62 5)  4  Carpal tunnel syndrome, left (354 0) (G56 02)  5  Chronic left shoulder pain (719 41,338 29) (M25 512,G89 29)  6  Complex regional pain syndrome type 1 of left upper extremity (337 21) (G90 512)  7  Encounter for screening for cardiovascular disorders (V81 2) (Z13 6)  8  Epigastric pain (789 06) (R10 13)  9  Helicobacter pylori (H  pylori) infection (041 86) (A04 8)  10  Hematochezia (578 1) (K92 1)  11  Incomplete tear of left rotator cuff (840 4) (M75 112)  12  Neuropathy, peripheral (356 9) (G62 9)  13  Numbness and tingling in left arm (782 0) (R20 0,R20 2)  14  Shoulder weakness (719 61) (R29 898)  15   Superior glenoid labrum lesion of left shoulder, initial encounter (840 7) (B49 811R)    Past Medical History   · Denied: History of depression   · Denied: History of substance abuse    Surgical History   · History of Foot Repair   · History of Hand Surgery   · History of Wrist Surgery    Family History  Mother    · Family history of diabetes mellitus (V18 0) (Z83 3)   · Denied: Family history of mental disorder   · Denied: Family history of substance abuse  Father    · Family history of arthritis (V17 7) (Z82 61)   · Denied: Family history of mental disorder   · Denied: Family history of substance abuse  Maternal Grandmother    · Family history of malignant neoplasm (V16 9) (Z80 9)    Social History   · Current smoker (305 1) (F17 200)    Current Meds  1  Gabapentin 300 MG Oral Capsule; One tab by mouth each bedtime x3 days, if there is   no significant drowsiness, then increase to one tab by mouth 3 times a day; Therapy: [id number] to (Last Rx:13Nov2017)  Requested for: 69EMW1340 Ordered  2  Ibuprofen 800 MG Oral Tablet; take 1 tablet by mouth three times a day if needed; Therapy: 15VDV3257 to (Ross Perches)  Requested for: [phone]; Last   Rx:24Oct2017 Ordered  3  Pantoprazole Sodium 40 MG Oral Tablet Delayed Release; take one tablet by mouth   twice daily; Therapy: 25RMS4545 to (Evaluate:87Ema9948)  Requested for: 89Ste0565; Last   Rx:21Wdf6264 Ordered  4  PredniSONE 20 MG Oral Tablet; TAKE 1 TABLET DAILY x5 days then 1/2 tab daily x2   days; Therapy: 68OCM3985 to (Last Rx:13Nov2017)  Requested for: 46IAR7026 Ordered    Allergies  1  Penicillins  2  No Known Environmental Allergies  3  No Known Food Allergies    Physical Exam  Left shoulder: Tenderness to palpation diffusely  Range of motion is forward flexion 90° limited by guarding/pain, external rotation-abduction 70° limited by pain, and internal rotation-abduction 10° limited by pain  Badillo impingement sign is positive  Empty can test is positive  Speed's test is positive  O'paresh's test is equivocal  Cross-body adduction test is positive  4/5 strength globally in the left shoulder limited by pain    Constitutional - General appearance: Normal    Eyes - Conjunctiva and lids: Normal    Cardiovascular - Pulses: Normal   Lungs - Respiratory effort: Normal   Skin - Skin and subcutaneous tissue: Normal    Neurologic - Sensation: Normal    Psychiatric - Mood and affect: Normal         Results/Data  I personally reviewed the films/images/results in the office today  My interpretation follows  MRI Review Left shoulder 12/10/17: High-grade partial tear of the supraspinatus tendon  SLAP tear with mild medial subluxation of the long head of the biceps tendon  Mild subacromial bursitis  Moderate degenerative changes in the acromioclavicular joint with extensive subchondral bone marrow edema  Diagnostic Review EMG 12/4/17:  Moderate median neuropathy at the wrist       Signatures  Matt Miller PA-C

## 2018-02-05 NOTE — PROGRESS NOTES
38 y o male presents to the office for bilateral carpal tunnel  He is left hand dominant  2 weeks ago he had left shoulder surgery performed by Dr Sulma Stevenson  His left hand is worse than his right  His left small finger is numb from his position in the sling  The trigger finger in the right long finger and his carpal tunnel in his left wrist bothers him the most  He has previously had cortisone injections that helped his symptoms  Review of Systems  Review of systems negative unless otherwise specified in HPI    Past Medical History  Past Medical History:   Diagnosis Date    Arthritis     Chronic pain     left shoulder    CTS (carpal tunnel syndrome)     B/L    GERD (gastroesophageal reflux disease)     Hematochezia     Insomnia     Peripheral neuropathy     left       Past Surgical History  Past Surgical History:   Procedure Laterality Date    FOOT SURGERY Bilateral     HAND SURGERY Bilateral     [facility] SHLDR ARTHROSCOP,SURG,CAPSULORRHAPHY Left 1/25/2018    Procedure: LEFT SHOULDER ARTHROSCOPIC SLAP REPAIR, ROTATOR CUFF DEBRIDEMENT, SUBACROMIAL DECOMPRESSION, DISTAL CLAVICLE EXCISION;  Surgeon: Brayden Arrington MD;  Location: [facility];  Service: Orthopedics       Current Medications  Current Outpatient Prescriptions on File Prior to Visit   Medication Sig Dispense Refill    gabapentin (NEURONTIN) 300 mg capsule Take by mouth      ibuprofen (MOTRIN) 800 mg tablet Take 1 tablet by mouth 3 (three) times a day as needed      oxyCODONE (ROXICODONE) 5 mg immediate release tablet Earliest Fill Date: 1/25/18 Take 1-2 tablets by mouth every 3-4 hours as needed for pain  60 tablet 0    predniSONE 20 mg tablet Take by mouth       No current facility-administered medications on file prior to visit          Physical exam  · General: Awake, Alert, Oriented  · Eyes: Pupils equal, round and reactive to light  · Heart: regular rate and rhythm  · Lungs: No audible wheezing  · Abdomen: soft  bilateral wrist and hand  · Muscle mass in palm and inner osseous muscles is good  · Sensation intact median, radial and ulnar nerve distribution left hand  · Long finger triggers in right hand        Procedure  Hand/upper extremity injection  Date/Time: 2/6/2018 9:44 AM  Consent given by: patient  Site marked: site marked  Supporting Documentation  Indications: pain   Procedure Details  Condition:trigger finger Location: long finger - R long A1   Needle size: 25 G  Ultrasound guidance: no  Approach: volar  Medications administered: 1 mL lidocaine 1 %; 1 mL bupivacaine 0 25 %; 6 mg betamethasone acetate-betamethasone sodium phosphate 6 (3-3) mg/mL  Patient tolerance: patient tolerated the procedure well with no immediate complications  Dressing:  Sterile dressing applied   Hand/upper extremity injection  Date/Time: 2/6/2018 9:49 AM  Consent given by: patient  Site marked: site marked  Supporting Documentation  Indications: pain   Procedure Details  Condition:carpal tunnel syndrome Site: L carpal tunnel   Needle size: 25 G  Ultrasound guidance: no  Approach: volar  Medications administered: 1 mL bupivacaine 0 25 %; 1 mL lidocaine 1 %; 6 mg betamethasone acetate-betamethasone sodium phosphate 6 (3-3) mg/mL  Patient tolerance: patient tolerated the procedure well with no immediate complications  Dressing:  Sterile dressing applied           Assessment/Plan:   42 y o male received two injections today in the office  One for his right long trigger finger and the other for his left carpal tunnel  He was told to take detailed notes on how effective the injections are  We will see him back in 4 weeks  He will be progressing with his left shoulder rehabilitation

## 2018-02-06 ENCOUNTER — OFFICE VISIT (OUTPATIENT)
Dept: OBGYN CLINIC | Facility: HOSPITAL | Age: 43
End: 2018-02-06
Payer: MEDICARE

## 2018-02-06 VITALS
HEART RATE: 100 BPM | HEIGHT: 70 IN | BODY MASS INDEX: 27.2 KG/M2 | WEIGHT: 190 LBS | SYSTOLIC BLOOD PRESSURE: 113 MMHG | DIASTOLIC BLOOD PRESSURE: 79 MMHG

## 2018-02-06 DIAGNOSIS — G56.01 CARPAL TUNNEL SYNDROME ON RIGHT: ICD-10-CM

## 2018-02-06 DIAGNOSIS — G56.02 CARPAL TUNNEL SYNDROME ON LEFT: ICD-10-CM

## 2018-02-06 DIAGNOSIS — M65.331 TRIGGER MIDDLE FINGER OF RIGHT HAND: Primary | ICD-10-CM

## 2018-02-06 PROCEDURE — 20526 THER INJECTION CARP TUNNEL: CPT | Performed by: ORTHOPAEDIC SURGERY

## 2018-02-06 PROCEDURE — 20550 NJX 1 TENDON SHEATH/LIGAMENT: CPT | Performed by: ORTHOPAEDIC SURGERY

## 2018-02-06 PROCEDURE — 99213 OFFICE O/P EST LOW 20 MIN: CPT | Performed by: ORTHOPAEDIC SURGERY

## 2018-02-06 RX ORDER — BUPIVACAINE HYDROCHLORIDE 2.5 MG/ML
1 INJECTION, SOLUTION INFILTRATION; PERINEURAL
Status: COMPLETED | OUTPATIENT
Start: 2018-02-06 | End: 2018-02-06

## 2018-02-06 RX ORDER — BETAMETHASONE SODIUM PHOSPHATE AND BETAMETHASONE ACETATE 3; 3 MG/ML; MG/ML
6 INJECTION, SUSPENSION INTRA-ARTICULAR; INTRALESIONAL; INTRAMUSCULAR; SOFT TISSUE
Status: COMPLETED | OUTPATIENT
Start: 2018-02-06 | End: 2018-02-06

## 2018-02-06 RX ORDER — LIDOCAINE HYDROCHLORIDE 10 MG/ML
1 INJECTION, SOLUTION INFILTRATION; PERINEURAL
Status: COMPLETED | OUTPATIENT
Start: 2018-02-06 | End: 2018-02-06

## 2018-02-06 RX ADMIN — BUPIVACAINE HYDROCHLORIDE 1 ML: 2.5 INJECTION, SOLUTION INFILTRATION; PERINEURAL at 09:49

## 2018-02-06 RX ADMIN — LIDOCAINE HYDROCHLORIDE 1 ML: 10 INJECTION, SOLUTION INFILTRATION; PERINEURAL at 09:49

## 2018-02-06 RX ADMIN — BUPIVACAINE HYDROCHLORIDE 1 ML: 2.5 INJECTION, SOLUTION INFILTRATION; PERINEURAL at 09:44

## 2018-02-06 RX ADMIN — BETAMETHASONE SODIUM PHOSPHATE AND BETAMETHASONE ACETATE 6 MG: 3; 3 INJECTION, SUSPENSION INTRA-ARTICULAR; INTRALESIONAL; INTRAMUSCULAR; SOFT TISSUE at 09:49

## 2018-02-06 RX ADMIN — LIDOCAINE HYDROCHLORIDE 1 ML: 10 INJECTION, SOLUTION INFILTRATION; PERINEURAL at 09:44

## 2018-02-06 RX ADMIN — BETAMETHASONE SODIUM PHOSPHATE AND BETAMETHASONE ACETATE 6 MG: 3; 3 INJECTION, SUSPENSION INTRA-ARTICULAR; INTRALESIONAL; INTRAMUSCULAR; SOFT TISSUE at 09:44

## 2018-02-07 ENCOUNTER — OFFICE VISIT (OUTPATIENT)
Dept: OBGYN CLINIC | Facility: CLINIC | Age: 43
End: 2018-02-07

## 2018-02-07 VITALS
HEIGHT: 68 IN | WEIGHT: 190 LBS | SYSTOLIC BLOOD PRESSURE: 104 MMHG | BODY MASS INDEX: 28.79 KG/M2 | DIASTOLIC BLOOD PRESSURE: 72 MMHG | HEART RATE: 116 BPM

## 2018-02-07 DIAGNOSIS — M75.112 INCOMPLETE TEAR OF LEFT ROTATOR CUFF: ICD-10-CM

## 2018-02-07 DIAGNOSIS — S43.432D SUPERIOR GLENOID LABRUM LESION OF LEFT SHOULDER, SUBSEQUENT ENCOUNTER: Primary | ICD-10-CM

## 2018-02-07 DIAGNOSIS — M19.012 ARTHROSIS OF LEFT ACROMIOCLAVICULAR JOINT: ICD-10-CM

## 2018-02-07 DIAGNOSIS — M75.42 IMPINGEMENT SYNDROME OF LEFT SHOULDER: ICD-10-CM

## 2018-02-07 PROCEDURE — 99024 POSTOP FOLLOW-UP VISIT: CPT | Performed by: ORTHOPAEDIC SURGERY

## 2018-02-07 NOTE — PROGRESS NOTES
Patient Name:  Smita Gambino  MRN:  [id number]    Assessment & Plan    Left shoulder arthroscopic SLAP repair, rotator cuff debridement, subacromial decompression, and distal clavicle excision 1/25/18  Start physical therapy  Continue sling until 2/22/18, then gradually wean as tolerated  Follow-up in 1 month  History of the Present Illness    Postop evaluation after left shoulder arthroscopic SLAP repair, rotator cuff debridement, subacromial decompression, and distal clavicle excision on 1/25/18  Patient reports gradual improvement  He continues to have pain diffusely in his left shoulder  No numbness or tingling  He has not started physical therapy yet  He acknowledges that he was instructed to start 5-7 days after surgery, but he felt that he should wait based on the details of his surgery  He has been wearing the sling as instructed  Physical Exam    /72   Pulse (!) 116   Ht 5' 8" (1 727 m)   Wt 86 2 kg (190 lb)   BMI 28 89 kg/m²     Left shoulder:  Incisions are clean dry and intact  No erythema  No excessive soft tissue swelling  Tenderness to palpation diffusely within expected range  2+ radial pulse  Sensation intact to light touch throughout the left upper extremity

## 2018-02-23 ENCOUNTER — EVALUATION (OUTPATIENT)
Dept: PHYSICAL THERAPY | Facility: CLINIC | Age: 43
End: 2018-02-23
Payer: MEDICARE

## 2018-02-23 DIAGNOSIS — M75.42 IMPINGEMENT SYNDROME OF LEFT SHOULDER: ICD-10-CM

## 2018-02-23 DIAGNOSIS — S43.432D SUPERIOR GLENOID LABRUM LESION OF LEFT SHOULDER, SUBSEQUENT ENCOUNTER: ICD-10-CM

## 2018-02-23 DIAGNOSIS — M25.512 LEFT SHOULDER PAIN, UNSPECIFIED CHRONICITY: Primary | ICD-10-CM

## 2018-02-23 DIAGNOSIS — M75.112 INCOMPLETE TEAR OF LEFT ROTATOR CUFF: ICD-10-CM

## 2018-02-23 DIAGNOSIS — M19.012 ARTHROSIS OF LEFT ACROMIOCLAVICULAR JOINT: ICD-10-CM

## 2018-02-23 PROCEDURE — G8990 OTHER PT/OT CURRENT STATUS: HCPCS | Performed by: PHYSICAL THERAPIST

## 2018-02-23 PROCEDURE — 97162 PT EVAL MOD COMPLEX 30 MIN: CPT | Performed by: PHYSICAL THERAPIST

## 2018-02-23 PROCEDURE — G8991 OTHER PT/OT GOAL STATUS: HCPCS | Performed by: PHYSICAL THERAPIST

## 2018-02-23 NOTE — PROGRESS NOTES
PT Evaluation     Today's date: 2018  Patient name: Rodriguez Fajardo  : 1975  MRN: [id number]  Referring provider: Pallavi Ocampo MD  Dx:   Encounter Diagnosis     ICD-10-CM    1  Left shoulder pain, unspecified chronicity M25 512    2  Incomplete tear of left rotator cuff M75 112 Ambulatory referral to Physical Therapy   3  Superior glenoid labrum lesion of left shoulder, subsequent encounter [id number] Ambulatory referral to Physical Therapy   4  Impingement syndrome of left shoulder M75 42 Ambulatory referral to Physical Therapy   5  Arthrosis of left acromioclavicular joint M19 012 Ambulatory referral to Physical Therapy       Start Time: 08  Stop Time: 9448  Total time in clinic (min): 35 minutes    Assessment  Impairments: abnormal or restricted ROM, activity intolerance and lacks appropriate home exercise program    Patient is irritable  Assessment details: Patient is a 38year old male presenting to PT s/p L shoulder surgery with pain and decreased range of motion  Please see patients protocol as per Dr Tram Yeh for rehabilitation  Patient would benefit from skilled PT services to address these issues and to maximize function    Thank you for the referral    Understanding of Dx/Px/POC: good   Prognosis: good    Goals  STG in 4 weeks  - Decrease pain to 4/10 at worst  - Improve ROM to 90 degrees shoulder flexion PROM and 30 degrees ER at 0 degrees shoulder abduction PROM    LTG in 12 weeks  - Return to Prior Level of Function  - Patient will be independent with HEP    Plan  Patient would benefit from: skilled PT  Planned modality interventions: TENS, cryotherapy and thermotherapy: hydrocollator packs  Planned therapy interventions: manual therapy, therapeutic activities, therapeutic exercise, neuromuscular re-education, home exercise program and flexibility  Frequency: 2x week  Duration in weeks: 12  Treatment plan discussed with: patient        Subjective Evaluation    History of Present Illness  Mechanism of injury: Patient reports he had L shoulder surgery on 18  Dr Amparo Porter performed: Left shoulder arthroscopic SLAP repair, rotator cuff debridement, subacromial decompression, and distal clavicle excision to his L shoulder  Patient reports he was released the same day after surgery  Patient reports he is feeling shoulder pain throughout his shoulder girdle muscles due to being in the sling all the time  Pain  Current pain ratin  At best pain ratin  At worst pain rating: 10  Quality: discomfort and dull ache    Treatments  Current treatment: physical therapy  Patient Goals  Patient goals for therapy: decreased pain, increased motion, increased strength and independence with ADLs/IADLs          Objective     Passive Range of Motion     Right Shoulder   Flexion: 40 degrees with pain  External rotation 0°: 10 degrees with pain    Additional Passive Range of Motion Details  Incisions are intact and heeling well        Flowsheet Rows    Flowsheet Row Most Recent Value   PT/OT G-Codes   Current Score  22   Projected Score  58   FOTO information reviewed  Yes   Assessment Type  Evaluation   G code set  Other PT/OT Primary   Other PT Primary Current Status ()  CL   Other PT Primary Goal Status ()  CK          Precautions: B carpal tunnel syndrome, GERD, Insomnia    Daily Treatment Diary     Manual                                                                                   Exercise Diary                           L wrist flexion/extension HEP            L power  on sling squeeze pad HEP            L finger  on sling squeeze pad HEP            L elbow PROM flex/ext with opposite arm HEP                                                                                                                                                                                                                   Modalities

## 2018-03-22 NOTE — PROGRESS NOTES
Patient failed to show for any further sessions after IE -     Discharge skilled PT at this time - progress towards original goals Not Achieved  Thank you for this referral - patient to follow with MD prn

## 2018-03-29 ENCOUNTER — OFFICE VISIT (OUTPATIENT)
Dept: OBGYN CLINIC | Facility: HOSPITAL | Age: 43
End: 2018-03-29
Payer: MEDICARE

## 2018-03-29 VITALS
HEIGHT: 70 IN | SYSTOLIC BLOOD PRESSURE: 110 MMHG | HEART RATE: 75 BPM | BODY MASS INDEX: 27.92 KG/M2 | DIASTOLIC BLOOD PRESSURE: 75 MMHG | WEIGHT: 195 LBS

## 2018-03-29 DIAGNOSIS — G56.02 CARPAL TUNNEL SYNDROME, LEFT: Primary | ICD-10-CM

## 2018-03-29 PROCEDURE — 99213 OFFICE O/P EST LOW 20 MIN: CPT | Performed by: ORTHOPAEDIC SURGERY

## 2018-03-29 NOTE — PROGRESS NOTES
38 y o male presents to the office for follow up bilateral carpal tunnel and right long trigger finger  Last visit, he received injections which did not relieve his symptoms  He is experiencing numbness in bilateral hands which starts in his small fingers  His right long finger still triggers and is becoming more painful  He is still recovering from his left shoulder surgery done by Dr Kourtney Bryant  Review of Systems  Review of systems negative unless otherwise specified in HPI    Past Medical History  Past Medical History:   Diagnosis Date    Arthritis     Chronic pain     left shoulder    CTS (carpal tunnel syndrome)     B/L    GERD (gastroesophageal reflux disease)     Hematochezia     Insomnia     Peripheral neuropathy     left       Past Surgical History  Past Surgical History:   Procedure Laterality Date    FOOT SURGERY Bilateral     HAND SURGERY Bilateral     [place] SHLDR ARTHROSCOP,SURG,CAPSULORRHAPHY Left 1/25/2018    Procedure: LEFT SHOULDER ARTHROSCOPIC SLAP REPAIR, ROTATOR CUFF DEBRIDEMENT, SUBACROMIAL DECOMPRESSION, DISTAL CLAVICLE EXCISION;  Surgeon: Benjamin Núñez MD;  Location: [facility] MAIN OR;  Service: Orthopedics       Current Medications  No current outpatient prescriptions on file prior to visit  No current facility-administered medications on file prior to visit          Recent Labs [facility])    0  Lab Value Date/Time   HCT 48 7 11/10/2017 0835   HGB 16 5 11/10/2017 0835   WBC 6 83 11/10/2017 0835   CRP 4 7 (H) 11/10/2017 0835   GLUCOSE 92 01/16/2018 1346   HGBA1C 5 6 05/24/2017 1748         Physical exam  · General: Awake, Alert, Oriented  · Eyes: Pupils equal, round and reactive to light  · Heart: regular rate and rhythm  · Lungs: No audible wheezing  · Abdomen: soft  bilateral upper extremity  · Right long finger nodule present  · Right long finger triggering  · Positive Tinel's at median nerve bilaterally      Imaging  None    Procedure  None    Assessment/Plan: 38 y o male has bilateral carpal tunnel and right long trigger finger  He is still recovering from his shoulder surgery  He wishes to proceed with surgery once he has recovered from his shoulder  We will see him back in 6 weeks

## 2018-10-05 ENCOUNTER — OFFICE VISIT (OUTPATIENT)
Dept: INTERNAL MEDICINE CLINIC | Facility: CLINIC | Age: 43
End: 2018-10-05
Payer: MEDICARE

## 2018-10-05 VITALS
OXYGEN SATURATION: 99 % | HEIGHT: 70 IN | HEART RATE: 91 BPM | WEIGHT: 193 LBS | DIASTOLIC BLOOD PRESSURE: 76 MMHG | SYSTOLIC BLOOD PRESSURE: 110 MMHG | BODY MASS INDEX: 27.63 KG/M2

## 2018-10-05 DIAGNOSIS — K27.9 PEPTIC ULCER DISEASE: ICD-10-CM

## 2018-10-05 DIAGNOSIS — R10.12 ABDOMINAL PAIN, ACUTE, LEFT UPPER QUADRANT: ICD-10-CM

## 2018-10-05 DIAGNOSIS — Z13.31 DEPRESSION SCREENING NEGATIVE: ICD-10-CM

## 2018-10-05 DIAGNOSIS — Z71.3 DIETARY COUNSELING AND SURVEILLANCE: ICD-10-CM

## 2018-10-05 DIAGNOSIS — K92.1 HEMATOCHEZIA: Primary | ICD-10-CM

## 2018-10-05 PROBLEM — A04.8 HELICOBACTER PYLORI (H. PYLORI) INFECTION: Status: RESOLVED | Noted: 2017-07-13 | Resolved: 2018-10-05

## 2018-10-05 PROBLEM — M75.42 IMPINGEMENT SYNDROME OF LEFT SHOULDER: Status: RESOLVED | Noted: 2018-01-25 | Resolved: 2018-10-05

## 2018-10-05 PROBLEM — M25.512 LEFT SHOULDER PAIN: Status: RESOLVED | Noted: 2018-02-23 | Resolved: 2018-10-05

## 2018-10-05 PROBLEM — M75.112 INCOMPLETE TEAR OF LEFT ROTATOR CUFF: Status: RESOLVED | Noted: 2017-12-11 | Resolved: 2018-10-05

## 2018-10-05 PROBLEM — G56.02 CARPAL TUNNEL SYNDROME, LEFT: Status: RESOLVED | Noted: 2017-12-04 | Resolved: 2018-10-05

## 2018-10-05 PROBLEM — S43.432A SUPERIOR GLENOID LABRUM LESION OF LEFT SHOULDER: Status: RESOLVED | Noted: 2017-12-11 | Resolved: 2018-10-05

## 2018-10-05 PROBLEM — M19.012 ARTHROSIS OF LEFT ACROMIOCLAVICULAR JOINT: Status: RESOLVED | Noted: 2017-10-24 | Resolved: 2018-10-05

## 2018-10-05 PROCEDURE — 99214 OFFICE O/P EST MOD 30 MIN: CPT | Performed by: INTERNAL MEDICINE

## 2018-10-05 RX ORDER — PANTOPRAZOLE SODIUM 40 MG/1
40 TABLET, DELAYED RELEASE ORAL 2 TIMES DAILY
Qty: 60 TABLET | Refills: 3 | Status: SHIPPED | OUTPATIENT
Start: 2018-10-05 | End: 2020-01-29

## 2018-10-05 NOTE — PATIENT INSTRUCTIONS
Peptic Ulcer   WHAT YOU NEED TO KNOW:   What is a peptic ulcer? A peptic ulcer is an open sore in the lining of your stomach, intestine, or esophagus  Peptic ulcers have different names, depending on their location  Gastric ulcers are peptic ulcers in the stomach  Duodenal ulcers are peptic ulcers in the small intestine  A peptic ulcer in the esophagus is called an esophageal ulcer  Peptic ulcers may be a short-term or long-term problem  What causes a peptic ulcer? Most peptic ulcers are caused by bacteria in the stomach called Helicobacter pylori (H  pylori)  Certain medicines such as NSAIDs may cause peptic ulcers  Zollinger-Weber syndrome may increase your risk of a peptic ulcer because it increases acid in your stomach  Smoking and drinking too much alcohol can also increase your risk for a peptic ulcer  Your risk is also increased if you have a family member who has a peptic ulcer  What are the signs and symptoms of a peptic ulcer? The most common symptom is a burning feeling or pain in your upper abdomen  This may occur 1 to 3 hours after you eat or when your stomach is empty  The pain may be worse at night and may come and go for weeks  The pain may be relieved when you eat or take antacid medicine  You may also have nausea, vomiting, or burping  Ulcers can cause bleeding  Your bowel movements may be red or black  How is a peptic ulcer diagnosed? · Blood tests  may be done to test for H  pylori  · A sample of your bowel movement  may be sent to a lab for tests  The test can show if you have H  pylori or if there is blood in your bowel movements  · A urea breath test  checks for H  pylori  You will drink a liquid that has a radioactive carbon  Thirty minutes after you drink the liquid, you will blow into a bag  The radioactive carbon will show if H  pylori is present  · An endoscopy  uses a scope to see the inside of your digestive system   A scope is a long, flexible tube with a light on the end  A camera may be used with the scope to take pictures  During an endoscopy, your healthcare provider may find problems with how your digestive system is working  Samples may be taken from your digestive system and sent to a lab for tests  · An upper GI x-ray  is a picture of your stomach and intestines  You may be given a chalky liquid to drink before the pictures are taken  This liquid helps your stomach and intestines show up better on the x-rays  An upper GI x-ray can show if you have an ulcer  How is a peptic ulcer treated? · Medicines  that decrease the amount of acid made by your stomach may be given  You may also need medicines that protect your stomach lining from acid and antibiotics to treat H  pylori infection  · Surgery  may be needed if other treatment does not heal your ulcer  Surgery on the nerves in your stomach may be done to help your stomach make less acid  Another type of surgery removes part of your stomach  Surgery may also be done to close an ulcer that has caused a perforation (tear) through your stomach or intestines  When should I contact my healthcare provider? · You have a fever  · You have diarrhea or constipation  · Your stomach pain does not go away or gets worse after you take medicine  · You have questions or concerns about your condition or care  When should I seek immediate care or call 911? · You have a fast heartbeat, fast breathing, or are too dizzy or weak to stand up  · You have severe pain in your stomach  · Your vomit looks like coffee grounds or has blood in it  · Your bowel movements are bloody or black  · You have sudden shortness of breath  CARE AGREEMENT:   You have the right to help plan your care  Learn about your health condition and how it may be treated  Discuss treatment options with your caregivers to decide what care you want to receive  You always have the right to refuse treatment   The above information is an  only  It is not intended as medical advice for individual conditions or treatments  Talk to your doctor, nurse or pharmacist before following any medical regimen to see if it is safe and effective for you  © 2017 [employer]  Information is for End User's use only and may not be sold, redistributed or otherwise used for commercial purposes  All illustrations and images included in CareNotes® are the copyrighted property of [employer]  or [employer]  Diet for Stomach Ulcers and Gastritis   AMBULATORY CARE:   A diet for stomach ulcers and gastritis  is a meal plan that limits foods that irritate your stomach  Certain foods may worsen symptoms such as stomach pain, bloating, heartburn, or indigestion  Foods to limit or avoid:  You may need to avoid acidic, spicy, or high-fat foods  Not all foods affect everyone the same way  You will need to learn which foods worsen your symptoms and limit those foods  The following are some foods that may worsen ulcer or gastritis symptoms:  · Beverages:      ¨ Whole milk and chocolate milk    ¨ Hot cocoa and cola    ¨ Any beverage with caffeine    ¨ Regular and decaffeinated coffee    ¨ Peppermint and spearmint tea    ¨ Green and black tea, with or without caffeine    ¨ Orange and grapefruit juices    ¨ Drinks that contain alcohol    · Spices and seasonings:      ¨ Black and red pepper    ¨ Chili powder    ¨ Mustard seed and nutmeg    · Other foods:      ¨ Dairy foods made from whole milk or cream    ¨ Chocolate    ¨ Spicy or strongly flavored cheeses, such as jalapeno or black pepper    ¨ Highly seasoned, high-fat meats, such as sausage, salami, delgado, ham, and cold cuts    ¨ Hot chiles and peppers    ¨ Tomato products, such as tomato paste, tomato sauce, or tomato juice  Foods to include:  Eat a variety of healthy foods from all the food groups  Eat fruits, vegetables, whole grains, and fat-free or low-fat dairy foods   Whole grains include whole-wheat breads, cereals, pasta, and brown rice  Choose lean meats, poultry (chicken and turkey), fish, beans, eggs, and nuts  A healthy meal plan is low in unhealthy fats, salt, and added sugar  Healthy fats include olive oil and canola oil  Ask your dietitian for more information about a healthy diet  Other helpful guidelines:   · Do not eat right before bedtime  Stop eating at least 2 hours before bedtime  · Eat small, frequent meals  Your stomach may tolerate small, frequent meals better than large meals  © 2017 [employer] [facility] Information is for End User's use only and may not be sold, redistributed or otherwise used for commercial purposes  All illustrations and images included in CareNotes® are the copyrighted property of [employer]  or [employer]  The above information is an  only  It is not intended as medical advice for individual conditions or treatments  Talk to your doctor, nurse or pharmacist before following any medical regimen to see if it is safe and effective for you

## 2018-10-05 NOTE — PROGRESS NOTES
INTERNAL MEDICINE FOLLOW-UP OFFICE VISIT  [facility]    NAME: Shital Franklin  AGE: 37 y o  SEX: male  : 1975     DATE: 10/5/2018     Assessment and Plan:     Assessment  1  Hematochezia    2  Peptic ulcer disease    3  BMI 27 0-27 9,adult    4  Abdominal pain, acute, left upper quadrant      Plan    Patient's signs/symptoms concerning for recurrent peptic ulcer disease/upper GI bleed  Should repeat EGD  If no bleeding source found, may have to consider repeat colonoscopy and/or capsule endoscopy  Restart patient back on pantoprazole - will prescribe twice daily  Will request that he gets in with GI ASAP  Check some basic lab work  Patient's Body mass index is 27 69 kg/m²  Discussed the patient's BMI  The BMI is above average; BMI counseling and education was provided to the patient  General weight loss/lifestyle modification strategies discussed (elicit support from others; identify saboteurs; non-food rewards, etc)  Diet interventions: discussed portion control and eating 3-5 servings of fruits/vegetables daily  Regular aerobic exercise program was recommended at least 3 times per week    Orders Placed This Encounter   Procedures    CBC    Comprehensive metabolic panel    Lipase    Ambulatory referral to Gastroenterology     New Medications Ordered This Visit   Medications    pantoprazole (PROTONIX) 40 mg tablet     Sig: Take 1 tablet (40 mg total) by mouth 2 (two) times a day     Dispense:  60 tablet     Refill:  3      Chief Complaint:     Chief Complaint   Patient presents with    Rectal Bleeding      History of Present Illness:     Patient presents for follow-up  Has not been seen in over a year  Previously treated in 2017 for peptic ulcer disease/H  Pylori infection  There was a single ulcer in duodenum  He was taking protonix for a time and then stopped taking it   He was doing ok until recently when he started experiencing left upper quadrant pain wrapping around to his back as well as hematochezia  He gets intermittent stabbing pain in his epigastric/LUQ area, but then that will go away and he will have this constant dull, gnawing pain  He is watching what he is eating  He isn't eating any spicy or greasy foods  He denies any NSAID use whatsoever  He was just ignoring the pain for awhile, but then his partner urged him to get evaluated  He has had no recent lab work  Colonoscopy from July 2017 was normal     The following portions of the patient's history were reviewed and updated as appropriate: allergies, current medications, past family history, past medical history, past social history, past surgical history and problem list      Review of Systems:     Review of Systems   Constitutional: Negative for activity change, appetite change and fatigue  Respiratory: Negative for apnea, cough, chest tightness, shortness of breath and wheezing  Cardiovascular: Negative for chest pain, palpitations and leg swelling  Gastrointestinal: Positive for abdominal pain and blood in stool  Negative for abdominal distention, constipation, diarrhea, nausea and vomiting  Musculoskeletal: Negative for arthralgias, back pain and gait problem  Skin: Negative for rash and wound  Neurological: Negative for dizziness, facial asymmetry, weakness, light-headedness and headaches  Psychiatric/Behavioral: Negative for behavioral problems, confusion, hallucinations, sleep disturbance and suicidal ideas  The patient is not nervous/anxious  Problem List:     Patient Active Problem List   Diagnosis    Complex regional pain syndrome type 1 of left upper extremity    Hematochezia      Objective:     /76 (BP Location: Left arm, Patient Position: Sitting)   Pulse 91   Ht 5' 10" (1 778 m)   Wt 87 5 kg (193 lb)   SpO2 99%   BMI 27 69 kg/m²     Physical Exam   Constitutional: He is oriented to person, place, and time   He appears well-developed and well-nourished  No distress  Eyes: Right eye exhibits no discharge  Left eye exhibits no discharge  Neck: Neck supple  No JVD present  No thyromegaly present  Cardiovascular: Normal rate and regular rhythm  No murmur heard  Pulmonary/Chest: Effort normal and breath sounds normal  No respiratory distress  He has no wheezes  He has no rales  He exhibits no tenderness  Abdominal: Soft  Bowel sounds are normal  He exhibits no distension and no mass  There is tenderness (tenderness over left flank area)  There is no rebound and no guarding  Musculoskeletal: Normal range of motion  He exhibits no edema  Lymphadenopathy:     He has no cervical adenopathy  Neurological: He is alert and oriented to person, place, and time  Skin: He is not diaphoretic  Psychiatric: He has a normal mood and affect  His behavior is normal      Pertinent Laboratory/Diagnostic Studies:    Laboratory Results: I have personally reviewed the pertinent laboratory results/reports     Radiology/Other Diagnostic Testing Results: I have personally reviewed pertinent reports  PHQ-9  Negative for depression with PHQ2 score of 0  Current Medications:     No current outpatient prescriptions on file prior to visit  No current facility-administered medications on file prior to visit          Meghan Gallagher DO  [facility]

## 2018-10-10 ENCOUNTER — OFFICE VISIT (OUTPATIENT)
Dept: GASTROENTEROLOGY | Facility: CLINIC | Age: 43
End: 2018-10-10
Payer: MEDICARE

## 2018-10-10 VITALS
WEIGHT: 193 LBS | DIASTOLIC BLOOD PRESSURE: 80 MMHG | BODY MASS INDEX: 27.63 KG/M2 | SYSTOLIC BLOOD PRESSURE: 115 MMHG | HEART RATE: 84 BPM | HEIGHT: 70 IN

## 2018-10-10 DIAGNOSIS — K27.9 PEPTIC ULCER DISEASE: ICD-10-CM

## 2018-10-10 DIAGNOSIS — K92.1 HEMATOCHEZIA: ICD-10-CM

## 2018-10-10 DIAGNOSIS — R10.12 ABDOMINAL PAIN, ACUTE, LEFT UPPER QUADRANT: ICD-10-CM

## 2018-10-10 PROCEDURE — 99213 OFFICE O/P EST LOW 20 MIN: CPT | Performed by: NURSE PRACTITIONER

## 2018-10-10 NOTE — PROGRESS NOTES
Assessment/Plan:    Patient left refusing care  Diagnoses and all orders for this visit:    Hematochezia  -     Ambulatory referral to Gastroenterology    Peptic ulcer disease  -     Ambulatory referral to Gastroenterology    Abdominal pain, acute, left upper quadrant  -     Ambulatory referral to Gastroenterology    [email or web address]  D@     Subjective:      Patient ID: Pedro Alvarado is a 37 y o  male  37year old male who is being seen in follow-up from last year for left upper quadrant pain, bright red blood per rectum, and epigastric pain  He also has abdominal bloating  He has not gotten the blood work get from his PCP  As I am trying to obtain a history he does not want to answer it and states he is here for capsule and a capsule only  He is refusing a physical exam and refusing a rectal exam   He has an angry outbursts in the office and states he wants to see the doctor not to nurse practitioner  So after sitting with the patient for 25 min and offering to help him with his symptoms he got up and left the office  The following portions of the patient's history were reviewed and updated as appropriate: He  has a past medical history of Arthritis; Arthrosis of 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); Chronic pain; CTS (carpal tunnel syndrome); GERD (gastroesophageal reflux disease); Helicobacter pylori (H  pylori) infection (7/13/2017); Hematochezia; Impingement syndrome of left shoulder (1/25/2018); Incomplete tear of left rotator cuff (12/11/2017); Insomnia; Peripheral neuropathy; and Superior glenoid labrum lesion of left shoulder (12/11/2017)  He   Patient Active Problem List    Diagnosis Date Noted    Complex regional pain syndrome type 1 of left upper extremity 11/13/2017    Hematochezia 06/23/2017     He  has a past surgical history that includes Foot surgery (Bilateral); Hand surgery (Bilateral); pr shldr arthroscop,surg,capsulorrhaphy (Left, 1/25/2018); and Wrist surgery    His family history includes Arthritis in his father; Cancer in his maternal grandmother; Diabetes in his mother  He  reports that he quit smoking about 21 months ago  He has never used smokeless tobacco  He reports that he uses drugs, including Marijuana  He reports that he does not drink alcohol  Current Outpatient Prescriptions   Medication Sig Dispense Refill    pantoprazole (PROTONIX) 40 mg tablet Take 1 tablet (40 mg total) by mouth 2 (two) times a day 60 tablet 3     No current facility-administered medications for this visit  Current Outpatient Prescriptions on File Prior to Visit   Medication Sig    pantoprazole (PROTONIX) 40 mg tablet Take 1 tablet (40 mg total) by mouth 2 (two) times a day     No current facility-administered medications on file prior to visit  He is allergic to penicillins       Review of Systems   Gastrointestinal: Positive for abdominal distention, abdominal pain (LUQ) and blood in stool           Objective:      /80   Pulse 84   Ht 5' 10" (1 778 m)   Wt 87 5 kg (193 lb)   BMI 27 69 kg/m²          Physical Exam

## 2018-12-11 ENCOUNTER — TELEPHONE (OUTPATIENT)
Dept: INTERNAL MEDICINE CLINIC | Facility: CLINIC | Age: 43
End: 2018-12-11

## 2018-12-11 NOTE — TELEPHONE ENCOUNTER
Patient said he got a call from here    Doesn't know wh it was or why the call    I didn't see anything open    But he wanted to leave a message that he is returning the call

## 2019-04-18 ENCOUNTER — TELEPHONE (OUTPATIENT)
Dept: GASTROENTEROLOGY | Facility: CLINIC | Age: 44
End: 2019-04-18

## 2019-08-02 ENCOUNTER — TELEPHONE (OUTPATIENT)
Dept: INTERNAL MEDICINE CLINIC | Facility: CLINIC | Age: 44
End: 2019-08-02

## 2019-08-02 NOTE — TELEPHONE ENCOUNTER
Spoke to pt briefly about scheduling a wellness appt but got disconnected  Called back and voicemail was not set up

## 2020-01-23 ENCOUNTER — OFFICE VISIT (OUTPATIENT)
Dept: INTERNAL MEDICINE CLINIC | Facility: CLINIC | Age: 45
End: 2020-01-23
Payer: COMMERCIAL

## 2020-01-23 VITALS
WEIGHT: 197.2 LBS | HEART RATE: 98 BPM | DIASTOLIC BLOOD PRESSURE: 64 MMHG | BODY MASS INDEX: 28.23 KG/M2 | RESPIRATION RATE: 14 BRPM | HEIGHT: 70 IN | SYSTOLIC BLOOD PRESSURE: 102 MMHG

## 2020-01-23 DIAGNOSIS — M79.631 RIGHT FOREARM PAIN: Primary | ICD-10-CM

## 2020-01-23 PROCEDURE — 99213 OFFICE O/P EST LOW 20 MIN: CPT | Performed by: NURSE PRACTITIONER

## 2020-01-23 RX ORDER — MELOXICAM 7.5 MG/1
7.5 TABLET ORAL DAILY
Qty: 30 TABLET | Refills: 0 | Status: SHIPPED | OUTPATIENT
Start: 2020-01-23 | End: 2020-02-27 | Stop reason: ALTCHOICE

## 2020-01-23 NOTE — PATIENT INSTRUCTIONS
Muscle Strain   WHAT YOU NEED TO KNOW:   A muscle strain is a twist, pull, or tear of a muscle or tendon  A tendon is a strong elastic tissue that connects a muscle to a bone  Signs of a strained muscle include bruising and swelling over the area, pain with movement, and loss of strength  DISCHARGE INSTRUCTIONS:   Return to the emergency department if:   · You suddenly cannot feel or move your injured muscle  Contact your healthcare provider if:   · Your pain and swelling worsen or do not go away  · You have questions or concerns about your condition or care  Medicines:   · NSAIDs  help decrease swelling and pain or fever  This medicine is available with or without a doctor's order  NSAIDs can cause stomach bleeding or kidney problems in certain people  If you take blood thinner medicine, always ask your healthcare provider if NSAIDs are safe for you  Always read the medicine label and follow directions  · Muscle relaxers  help decrease pain and muscle spasms  · Take your medicine as directed  Contact your healthcare provider if you think your medicine is not helping or if you have side effects  Tell him of her if you are allergic to any medicine  Keep a list of the medicines, vitamins, and herbs you take  Include the amounts, and when and why you take them  Bring the list or the pill bottles to follow-up visits  Carry your medicine list with you in case of an emergency  Follow up with your healthcare provider as directed: Your healthcare provider may suggest that you have a follow-up visit before you go back to your usual activity  Write down your questions so you remember to ask them during your visits  Self-care:   · 3 to 7 days after the injury:  Use Rest, Ice, Compression, and Elevation (RICE) to help stop bruising and decrease pain and swelling  ¨ Rest:  Rest your muscle to allow your injury to heal  When the pain decreases, begin normal, slow movements   For mild and moderate muscle strains, you should rest your muscles for about 2 days  However, if you have a severe muscle strain, you should rest for 10 to 14 days  You may need to use crutches to walk if your muscle strain is in your legs or lower body  ¨ Ice:  Put an ice pack on the injured area  Put a towel between the ice pack and your skin  Do not put the ice pack directly on your skin  You can use a package of frozen peas instead of an ice pack  ¨ Compression:  You may need to wrap an elastic bandage around the area to decrease swelling  It should be tight enough for you to feel support  Do not wrap it too tightly  ¨ Elevation:  Keep the injured muscle raised above your heart if possible  For example if you have a strain of your lower leg muscle, lie down and prop your leg up on pillows  This helps decrease pain and swelling  · 3 to 21 days after the injury:  Start to slowly and regularly exercise your muscle  This will help it heal  If you feel pain, decrease how hard you are exercising  · 1 to 6 weeks after the injury:  Stretch the injured muscle  Hold the stretch for about 30 seconds  Do this 4 times a day  You may stretch the muscle until you feel a slight pull  Stop stretching if you feel pain  · 2 weeks to 6 months after the injury:  The goal of this phase is to return to the activity you were doing before the injury happened, without hurting the muscle again  · 3 weeks to 6 months after the injury:  Keep stretching and strengthening your muscles to avoid injury  Slowly increase the time and distance that you exercise  You may have signs and symptoms of muscle strain 6 months after the injury, even if you do things to help it heal  In this case, you may need surgery on the muscle  © 2017 [employer] Information is for End User's use only and may not be sold, redistributed or otherwise used for commercial purposes   All illustrations and images included in CareNotes® are the copyrighted property of [employer]  or [employer]  The above information is an  only  It is not intended as medical advice for individual conditions or treatments  Talk to your doctor, nurse or pharmacist before following any medical regimen to see if it is safe and effective for you
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
## 2020-01-29 ENCOUNTER — OFFICE VISIT (OUTPATIENT)
Dept: OBGYN CLINIC | Facility: CLINIC | Age: 45
End: 2020-01-29
Payer: COMMERCIAL

## 2020-01-29 VITALS
HEART RATE: 89 BPM | HEIGHT: 70 IN | DIASTOLIC BLOOD PRESSURE: 69 MMHG | WEIGHT: 194.8 LBS | BODY MASS INDEX: 27.89 KG/M2 | SYSTOLIC BLOOD PRESSURE: 113 MMHG

## 2020-01-29 DIAGNOSIS — G56.31 RADIAL TUNNEL SYNDROME, RIGHT: Primary | ICD-10-CM

## 2020-01-29 PROCEDURE — 99213 OFFICE O/P EST LOW 20 MIN: CPT | Performed by: ORTHOPAEDIC SURGERY

## 2020-01-29 PROCEDURE — 64450 NJX AA&/STRD OTHER PN/BRANCH: CPT | Performed by: ORTHOPAEDIC SURGERY

## 2020-01-29 RX ADMIN — TRIAMCINOLONE ACETONIDE 40 MG: 40 INJECTION, SUSPENSION INTRA-ARTICULAR; INTRAMUSCULAR at 09:48

## 2020-01-29 RX ADMIN — LIDOCAINE HYDROCHLORIDE 1 ML: 10 INJECTION, SOLUTION INFILTRATION; PERINEURAL at 09:48
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
## 2020-01-30 RX ORDER — LIDOCAINE HYDROCHLORIDE 10 MG/ML
1 INJECTION, SOLUTION INFILTRATION; PERINEURAL
Status: COMPLETED | OUTPATIENT
Start: 2020-01-29 | End: 2020-01-29

## 2020-01-30 RX ORDER — TRIAMCINOLONE ACETONIDE 40 MG/ML
40 INJECTION, SUSPENSION INTRA-ARTICULAR; INTRAMUSCULAR
Status: COMPLETED | OUTPATIENT
Start: 2020-01-29 | End: 2020-01-29

## 2020-02-13 ENCOUNTER — OFFICE VISIT (OUTPATIENT)
Dept: OBGYN CLINIC | Facility: CLINIC | Age: 45
End: 2020-02-13
Payer: COMMERCIAL

## 2020-02-13 VITALS
SYSTOLIC BLOOD PRESSURE: 110 MMHG | WEIGHT: 194.8 LBS | HEIGHT: 70 IN | HEART RATE: 86 BPM | BODY MASS INDEX: 27.89 KG/M2 | DIASTOLIC BLOOD PRESSURE: 78 MMHG

## 2020-02-13 DIAGNOSIS — M65.331 TRIGGER MIDDLE FINGER OF RIGHT HAND: ICD-10-CM

## 2020-02-13 DIAGNOSIS — M70.21 OLECRANON BURSITIS OF RIGHT ELBOW: ICD-10-CM

## 2020-02-13 DIAGNOSIS — G56.31 RADIAL TUNNEL SYNDROME, RIGHT: Primary | ICD-10-CM

## 2020-02-13 PROCEDURE — 20605 DRAIN/INJ JOINT/BURSA W/O US: CPT | Performed by: ORTHOPAEDIC SURGERY

## 2020-02-13 PROCEDURE — 99213 OFFICE O/P EST LOW 20 MIN: CPT | Performed by: ORTHOPAEDIC SURGERY

## 2020-02-13 PROCEDURE — 20550 NJX 1 TENDON SHEATH/LIGAMENT: CPT | Performed by: ORTHOPAEDIC SURGERY

## 2020-02-13 PROCEDURE — 3008F BODY MASS INDEX DOCD: CPT | Performed by: ORTHOPAEDIC SURGERY

## 2020-02-13 PROCEDURE — 1036F TOBACCO NON-USER: CPT | Performed by: ORTHOPAEDIC SURGERY

## 2020-02-13 RX ORDER — TRIAMCINOLONE ACETONIDE 40 MG/ML
20 INJECTION, SUSPENSION INTRA-ARTICULAR; INTRAMUSCULAR
Status: COMPLETED | OUTPATIENT
Start: 2020-02-13 | End: 2020-02-13

## 2020-02-13 RX ORDER — LIDOCAINE HYDROCHLORIDE 10 MG/ML
0.5 INJECTION, SOLUTION INFILTRATION; PERINEURAL
Status: COMPLETED | OUTPATIENT
Start: 2020-02-13 | End: 2020-02-13

## 2020-02-13 RX ADMIN — LIDOCAINE HYDROCHLORIDE 0.5 ML: 10 INJECTION, SOLUTION INFILTRATION; PERINEURAL at 09:00

## 2020-02-13 RX ADMIN — LIDOCAINE HYDROCHLORIDE 0.5 ML: 10 INJECTION, SOLUTION INFILTRATION; PERINEURAL at 09:01

## 2020-02-13 RX ADMIN — TRIAMCINOLONE ACETONIDE 20 MG: 40 INJECTION, SUSPENSION INTRA-ARTICULAR; INTRAMUSCULAR at 09:00

## 2020-02-13 RX ADMIN — TRIAMCINOLONE ACETONIDE 20 MG: 40 INJECTION, SUSPENSION INTRA-ARTICULAR; INTRAMUSCULAR at 09:01
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
## 2020-02-27 ENCOUNTER — OFFICE VISIT (OUTPATIENT)
Dept: OBGYN CLINIC | Facility: CLINIC | Age: 45
End: 2020-02-27
Payer: COMMERCIAL

## 2020-02-27 VITALS — WEIGHT: 194.8 LBS | HEIGHT: 70 IN | BODY MASS INDEX: 27.89 KG/M2

## 2020-02-27 VITALS
HEART RATE: 76 BPM | WEIGHT: 194.8 LBS | HEIGHT: 70 IN | SYSTOLIC BLOOD PRESSURE: 113 MMHG | BODY MASS INDEX: 27.89 KG/M2 | DIASTOLIC BLOOD PRESSURE: 78 MMHG

## 2020-02-27 DIAGNOSIS — G56.31 RADIAL TUNNEL SYNDROME, RIGHT: Primary | ICD-10-CM

## 2020-02-27 DIAGNOSIS — M70.21 OLECRANON BURSITIS OF RIGHT ELBOW: ICD-10-CM

## 2020-02-27 DIAGNOSIS — M65.331 TRIGGER MIDDLE FINGER OF RIGHT HAND: Primary | ICD-10-CM

## 2020-02-27 PROCEDURE — 1036F TOBACCO NON-USER: CPT | Performed by: ORTHOPAEDIC SURGERY

## 2020-02-27 PROCEDURE — 3008F BODY MASS INDEX DOCD: CPT | Performed by: ORTHOPAEDIC SURGERY

## 2020-02-27 PROCEDURE — 99214 OFFICE O/P EST MOD 30 MIN: CPT | Performed by: ORTHOPAEDIC SURGERY

## 2020-02-27 RX ORDER — HYDROCODONE BITARTRATE AND ACETAMINOPHEN 5; 325 MG/1; MG/1
1 TABLET ORAL EVERY 6 HOURS PRN
Qty: 20 TABLET | Refills: 0 | Status: SHIPPED | OUTPATIENT
Start: 2020-02-27 | End: 2020-03-08

## 2020-02-27 RX ORDER — LIDOCAINE HYDROCHLORIDE AND EPINEPHRINE 10; 10 MG/ML; UG/ML
10 INJECTION, SOLUTION INFILTRATION; PERINEURAL ONCE
Status: CANCELLED | OUTPATIENT
Start: 2020-02-27 | End: 2020-02-27

## 2020-02-27 NOTE — PROGRESS NOTES
CHIEF COMPLAINT:  Chief Complaint   Patient presents with    Right Elbow - Follow-up    Right Hand - Follow-up       SUBJECTIVE:  Philomena Munoz is a 53y o  year old  male who presents to the office for a follow-up s/p right long trigger injection administered 02/13/2020  Today patient states that he continues to have clicking and locking of his right long ringer  The patient denies any cardiac or pulmonary issues  Denies diabetes  Denies any history of MI, gastric ulcers, kidney or liver issues  Denies blood thinners            PAST MEDICAL HISTORY:  Past Medical History:   Diagnosis Date    Arthritis     Arthrosis of left acromioclavicular joint 10/24/2017    Chronic pain     left shoulder    CTS (carpal tunnel syndrome)     B/L    GERD (gastroesophageal reflux disease)     Helicobacter pylori (H  pylori) infection 7/13/2017    Hematochezia     Impingement syndrome of left shoulder 1/25/2018    Incomplete tear of left rotator cuff 12/11/2017    Insomnia     Peripheral neuropathy     left    Superior glenoid labrum lesion of left shoulder 12/11/2017       PAST SURGICAL HISTORY:  Past Surgical History:   Procedure Laterality Date    FOOT SURGERY Bilateral     HAND SURGERY Bilateral     [place] SHLDR ARTHROSCOP,SURG,CAPSULORRHAPHY Left 1/25/2018    Procedure: LEFT SHOULDER ARTHROSCOPIC SLAP REPAIR, ROTATOR CUFF DEBRIDEMENT, SUBACROMIAL DECOMPRESSION, DISTAL CLAVICLE EXCISION;  Surgeon: Tess Sethi MD;  Location: [facility];  Service: Orthopedics    WRIST SURGERY         FAMILY HISTORY:  Family History   Problem Relation Age of Onset    Diabetes Mother     Arthritis Father     Cancer Maternal Grandmother        SOCIAL HISTORY:  Social History     Tobacco Use    Smoking status: Former Smoker     Packs/day: 0 50     Years: 5 00     Pack years: 2 50     Types: Cigarettes     Last attempt to quit: 2017     Years since quitting: 3 1    Smokeless tobacco: Never Used   Substance Use Topics    Alcohol use: Yes     Frequency: Monthly or less     Drinks per session: 1 or 2     Binge frequency: Never     Comment: on occasion    Drug use: Yes     Types: Marijuana       MEDICATIONS:    Current Outpatient Medications:     HYDROcodone-acetaminophen (NORCO) 5-325 mg per tablet, Take 1 tablet by mouth every 6 (six) hours as needed for pain for up to 10 days To be taken after surgeryMax Daily Amount: 4 tablets, Disp: 20 tablet, Rfl: 0    ALLERGIES:  Allergies   Allergen Reactions    Penicillins Other (See Comments)     Bleeding from nose, mouth, vomiting       REVIEW OF SYSTEMS:  Review of Systems   Constitutional: Negative for chills, fever and unexpected weight change  HENT: Negative for hearing loss, nosebleeds and sore throat  Eyes: Negative for pain, redness and visual disturbance  Respiratory: Negative for cough, shortness of breath and wheezing  Cardiovascular: Negative for chest pain, palpitations and leg swelling  Gastrointestinal: Negative for abdominal pain, nausea and vomiting  Endocrine: Negative for polydipsia and polyuria  Genitourinary: Negative for dysuria and hematuria  Skin: Negative for rash and wound  Neurological: Negative for dizziness, light-headedness and headaches  Psychiatric/Behavioral: Negative for decreased concentration, dysphoric mood and suicidal ideas  The patient is not nervous/anxious          VITALS:  Vitals:    02/27/20 0759   BP: 113/78   Pulse: 76       LABS:  HgA1c:   Lab Results   Component Value Date    HGBA1C 5 6 05/24/2017     BMP:   Lab Results   Component Value Date    CALCIUM 8 8 01/16/2018    K 3 8 01/16/2018    CO2 29 01/16/2018     01/16/2018    BUN 23 01/16/2018    CREATININE 1 25 01/16/2018       _____________________________________________________  PHYSICAL EXAMINATION:  General: well developed and well nourished, alert, oriented times 3 and appears comfortable  Psychiatric: Normal  HEENT: Trachea Midline, No torticollis  Pulmonary: No audible wheezing or strider  Cardiovascular: No discernable arrhythmia   Skin: No masses, erythema, lacerations, fluctation, ulcerations  Neurovascular: Sensation Intact to the Median, Ulnar, Radial Nerve, Motor Intact to the Median, Ulnar, Radial Nerve and Pulses Intact    MUSCULOSKELETAL EXAMINATION:  right long finger:  Positive palpable nodule over the A1 pulley  Positive tenderness to palpation over A1 pulley  Positive clicking  Positive catching        ___________________________________________________  STUDIES REVIEWED:  No studies reviewed  PROCEDURES PERFORMED:  Procedures  No Procedures performed today    _____________________________________________________  ASSESSMENT/PLAN:  Right long trigger finger -continued clicking and locking after CSI   Trigger finger release  was discussed at length today including the risks and benefits, Pt would like to proceed with the surgery                 *Surgery- right long trigger finger release                 * detailed consent was signed                 * anesthesia- local only                 * PT/OT was ordered for wound care                 * Post op medication was sent to pharmacy on file    Surgery medication instructions: You will stop eating and drinking at midnight the night before your surgery, but you may continue to take your normal medications with a small sip of water  In the morning on the day of your surgery, we would like you to take the following medications (as long as you have never been told to avoid Tylenol or NSAIDs like ibuprofen, Naproxen, Aleve, Advil, etc):   Ibuprofen 600mg one tablet by mouth   Tylenol 500mg one tablet by mouth    After surgery, we would like you to take Ibuprofen 600mg one tablet by mouth every 6 hours with food (at breakfast, lunch and dinner)  AND Tylenol 500 mg one tablet by mouth every 6 hours  (at breakfast, lunch and dinner) for 5-7 days after your surgery    Please take these medication EVERYDAY after surgery for 5-7 days, and not just as needed  You can take these medications at the same time  Taking these medications after surgery will limit your need for prescription pain medication  We will also prescribe a narcotic pain medication for a limited time after surgery that you can take as needed for moderate or severe pain  Diagnoses and all orders for this visit:    Trigger middle finger of right hand  -     Ambulatory referral to PT/OT hand therapy  -     Case request operating room: RELEASE TRIGGER FINGER right long; Standing  -     HYDROcodone-acetaminophen (NORCO) 5-325 mg per tablet; Take 1 tablet by mouth every 6 (six) hours as needed for pain for up to 10 days To be taken after surgeryMax Daily Amount: 4 tablets  -     Case request operating room: RELEASE TRIGGER FINGER right long    Other orders  -     Diet NPO; Sips with meds; Standing  -     Height and weight upon arrival; Standing  -     Void on call to OR; Standing  -     lidocaine-epinephrine (XYLOCAINE/EPINEPHRINE) 1 %-1:100,000 injection 10 mL  -     sodium bicarbonate 8 4 % injection 25 mEq        Follow Up:  Return for after surgery  To Do Next Visit:  Re-evaluation of current issue      Operative Discussions:  Trigger Finger Release: The anatomy and physiology of trigger finger was discussed with the patient today in the office  Edema and increased contact pressure within the flexor tendons at the A1 pulley can cause pain, crepitation, and limitation of function  Treatment options include resting MP blocking splints to decrease edema, oral anti-inflammatory medications, home or formal therapy exercises, up to 2 steroid injections or surgical release  While majority of patients do respond to conservative treatment, up to 20% may require surgical release  The patient has elected release of the trigger finger  The patient has elected to undergo a release of the A1 pulley (trigger finger)  A small incision will be made over the palmar aspect of the hand, the tendon sheath holding the flexor tendons will be released  In the postoperative period, light activities are allowed immediately, driving is allowed when narcotic medication has stopped, and the incision may get wet after 2 days  Heavy activities (lifting more than approximately 10 pounds) will be allowed after the follow up appointment in 1-2 weeks  While the pain and discomfort within the wrist typically improves rapidly, some residual discomfort may be present for up to 6 weeks  The nodule that is typically palpable in the palmar aspect of the hand will not be removed, as this would necessitate removal of a portion of the flexor tendon, however the catching, clicking, and locking should resolve  Approximate success rate is 98%  The risks and benefits of the procedure were explained to the patient, which include, but are not limited to: Bleeding, infection, recurrence, pain, scar, damage to tendons, damage to nerves, and damage to blood vessels, need for future surgery and complications related to anesthesia  If bony work is done, risks also include malunion and nonunion  These risks, along with alternative conservative treatment options, and postoperative protocols were voiced back and understood by the patient  All questions were answered to the patient's satisfaction  The patient agrees to comply with a standard postoperative protocol, and is willing to proceed  Education was provided via written and auditory forms  There were no barriers to learning  Written handouts regarding wound care, incision and scar care, and general preoperative information, as well as risks and benefits were provided to the patient      Scribe Attestation    I,:   Juan Carlos Ozuna am acting as a scribe while in the presence of the attending physician :        I,:   Mario Alberto Holly MD personally performed the services described in this documentation    as scribed in my presence :

## 2020-02-27 NOTE — PROGRESS NOTES
CHIEF COMPLAINT:  Chief Complaint   Patient presents with    Right Elbow - Follow-up       SUBJECTIVE:  Asuncion Kaur is a 53y o  year old  male who presents to the office for a follow-up for several issues  Patient is here s/p cortisone steroid injection for chronic right olecranon bursitis administered 02/13/2020  Patient states that it did not provide him with much relief  Patient is also here for a follow-up after right radial tunnel injection administered 01/29/2020 which at his follow-up appointment on 02/13/2020 he stated he had significant relief of pain  Today patient states that he has continued burning pain in his forearm  Patient was also seen today for right long trigger finger that has not been resolved by injection administered 02/13/2020 and is being scheduled for trigger finger release  More information is in a no other note that was generated today due to the trigger finger release not being pertinent to the auto claim and being billed under his regular insurance        PAST MEDICAL HISTORY:  Past Medical History:   Diagnosis Date    Arthritis     Arthrosis of left acromioclavicular joint 10/24/2017    Chronic pain     left shoulder    CTS (carpal tunnel syndrome)     B/L    GERD (gastroesophageal reflux disease)     Helicobacter pylori (H  pylori) infection 7/13/2017    Hematochezia     Impingement syndrome of left shoulder 1/25/2018    Incomplete tear of left rotator cuff 12/11/2017    Insomnia     Peripheral neuropathy     left    Superior glenoid labrum lesion of left shoulder 12/11/2017       PAST SURGICAL HISTORY:  Past Surgical History:   Procedure Laterality Date    FOOT SURGERY Bilateral     HAND SURGERY Bilateral     [place] SHLDR ARTHROSCOP,SURG,CAPSULORRHAPHY Left 1/25/2018    Procedure: LEFT SHOULDER ARTHROSCOPIC SLAP REPAIR, ROTATOR CUFF DEBRIDEMENT, SUBACROMIAL DECOMPRESSION, DISTAL CLAVICLE EXCISION;  Surgeon: Bennett Turner MD;  Location[facility];  Service: Orthopedics    WRIST SURGERY         FAMILY HISTORY:  Family History   Problem Relation Age of Onset    Diabetes Mother     Arthritis Father     Cancer Maternal Grandmother        SOCIAL HISTORY:  Social History     Tobacco Use    Smoking status: Former Smoker     Packs/day: 0 50     Years: 5 00     Pack years: 2 50     Types: Cigarettes     Last attempt to quit: 2017     Years since quitting: 3 1    Smokeless tobacco: Never Used   Substance Use Topics    Alcohol use: Yes     Frequency: Monthly or less     Drinks per session: 1 or 2     Binge frequency: Never     Comment: on occasion    Drug use: Yes     Types: Marijuana       MEDICATIONS:    Current Outpatient Medications:     HYDROcodone-acetaminophen (NORCO) 5-325 mg per tablet, Take 1 tablet by mouth every 6 (six) hours as needed for pain for up to 10 days To be taken after surgeryMax Daily Amount: 4 tablets, Disp: 20 tablet, Rfl: 0    ALLERGIES:  Allergies   Allergen Reactions    Penicillins Other (See Comments)     Bleeding from nose, mouth, vomiting       REVIEW OF SYSTEMS:  Review of Systems   Constitutional: Negative for chills, fever and unexpected weight change  HENT: Negative for hearing loss, nosebleeds and sore throat  Eyes: Negative for pain, redness and visual disturbance  Respiratory: Negative for cough, shortness of breath and wheezing  Cardiovascular: Negative for chest pain, palpitations and leg swelling  Gastrointestinal: Negative for abdominal pain, nausea and vomiting  Endocrine: Negative for polydipsia and polyuria  Genitourinary: Negative for dysuria and hematuria  Skin: Negative for rash and wound  Neurological: Negative for dizziness, light-headedness and headaches  Psychiatric/Behavioral: Negative for decreased concentration, dysphoric mood and suicidal ideas  The patient is not nervous/anxious            LABS:  HgA1c:   Lab Results   Component Value Date    HGBA1C 5 6 05/24/2017     BMP:   Lab Results Component Value Date    CALCIUM 8 8 01/16/2018    K 3 8 01/16/2018    CO2 29 01/16/2018     01/16/2018    BUN 23 01/16/2018    CREATININE 1 25 01/16/2018       _____________________________________________________  PHYSICAL EXAMINATION:  General: well developed and well nourished, alert, oriented times 3 and appears comfortable  Psychiatric: Normal  HEENT: Trachea Midline, No torticollis  Pulmonary: No audible wheezing or strider  Cardiovascular: No discernable arrhythmia   Skin: No masses, erythema, lacerations, fluctation, ulcerations  Neurovascular: Sensation Intact to the Median, Ulnar, Radial Nerve, Motor Intact to the Median, Ulnar, Radial Nerve and Pulses Intact    MUSCULOSKELETAL EXAMINATION:    Right elbow  Good motion of the right elbow without pain  No tenderness to palpation over the radial tunnel    ___________________________________________________  STUDIES REVIEWED:  No studies reviewed  PROCEDURES PERFORMED:  Procedures  No Procedures performed today    _____________________________________________________  ASSESSMENT/PLAN:  S/P cortisone steroid injection for right chronic olecranon bursitis  * patient was advised to continue to avoid leaning on his right elbow    Right radial tunnel-previous injection provided significant relief  * patient was advised to call the office if he has any complaints questions or concerns otherwise he will follow up after his trigger finger release        Follow Up:  Return if symptoms worsen or fail to improve          To Do Next Visit:  Re-evaluation of current issue        Scribe Attestation    I,:   Jael Shaikh am acting as a scribe while in the presence of the attending physician :        I,:   Noemi Alfaro MD personally performed the services described in this documentation    as scribed in my presence :

## 2020-02-27 NOTE — H&P
CHIEF COMPLAINT:  Chief Complaint   Patient presents with    Right Elbow - Follow-up    Right Hand - Follow-up       SUBJECTIVE:  Love Sneed is a 53y o  year old  male who presents to the office for a follow-up s/p right long trigger injection administered 02/13/2020  Today patient states that he continues to have clicking and locking of his right long ringer  The patient denies any cardiac or pulmonary issues  Denies diabetes  Denies any history of MI, gastric ulcers, kidney or liver issues  Denies blood thinners            PAST MEDICAL HISTORY:  Past Medical History:   Diagnosis Date    Arthritis     Arthrosis of left acromioclavicular joint 10/24/2017    Chronic pain     left shoulder    CTS (carpal tunnel syndrome)     B/L    GERD (gastroesophageal reflux disease)     Helicobacter pylori (H  pylori) infection 7/13/2017    Hematochezia     Impingement syndrome of left shoulder 1/25/2018    Incomplete tear of left rotator cuff 12/11/2017    Insomnia     Peripheral neuropathy     left    Superior glenoid labrum lesion of left shoulder 12/11/2017       PAST SURGICAL HISTORY:  Past Surgical History:   Procedure Laterality Date    FOOT SURGERY Bilateral     HAND SURGERY Bilateral     DC SHLDR ARTHROSCOP,SURG,CAPSULORRHAPHY Left 1/25/2018    Procedure: LEFT SHOULDER ARTHROSCOPIC SLAP REPAIR, ROTATOR CUFF DEBRIDEMENT, SUBACROMIAL DECOMPRESSION, DISTAL CLAVICLE EXCISION;  Surgeon: Selina Phillips MD;  [facility];  Service: Orthopedics    WRIST SURGERY         FAMILY HISTORY:  Family History   Problem Relation Age of Onset    Diabetes Mother     Arthritis Father     Cancer Maternal Grandmother        SOCIAL HISTORY:  Social History     Tobacco Use    Smoking status: Former Smoker     Packs/day: 0 50     Years: 5 00     Pack years: 2 50     Types: Cigarettes     Last attempt to quit: 2017     Years since quitting: 3 1    Smokeless tobacco: Never Used   Substance Use Topics    Alcohol use: Yes     Frequency: Monthly or less     Drinks per session: 1 or 2     Binge frequency: Never     Comment: on occasion    Drug use: Yes     Types: Marijuana       MEDICATIONS:    Current Outpatient Medications:     HYDROcodone-acetaminophen (NORCO) 5-325 mg per tablet, Take 1 tablet by mouth every 6 (six) hours as needed for pain for up to 10 days To be taken after surgeryMax Daily Amount: 4 tablets, Disp: 20 tablet, Rfl: 0    ALLERGIES:  Allergies   Allergen Reactions    Penicillins Other (See Comments)     Bleeding from nose, mouth, vomiting       REVIEW OF SYSTEMS:  Review of Systems   Constitutional: Negative for chills, fever and unexpected weight change  HENT: Negative for hearing loss, nosebleeds and sore throat  Eyes: Negative for pain, redness and visual disturbance  Respiratory: Negative for cough, shortness of breath and wheezing  Cardiovascular: Negative for chest pain, palpitations and leg swelling  Gastrointestinal: Negative for abdominal pain, nausea and vomiting  Endocrine: Negative for polydipsia and polyuria  Genitourinary: Negative for dysuria and hematuria  Skin: Negative for rash and wound  Neurological: Negative for dizziness, light-headedness and headaches  Psychiatric/Behavioral: Negative for decreased concentration, dysphoric mood and suicidal ideas  The patient is not nervous/anxious          VITALS:  Vitals:    02/27/20 0759   BP: 113/78   Pulse: 76       LABS:  HgA1c:   Lab Results   Component Value Date    HGBA1C 5 6 05/24/2017     BMP:   Lab Results   Component Value Date    CALCIUM 8 8 01/16/2018    K 3 8 01/16/2018    CO2 29 01/16/2018     01/16/2018    BUN 23 01/16/2018    CREATININE 1 25 01/16/2018       _____________________________________________________  PHYSICAL EXAMINATION:  General: well developed and well nourished, alert, oriented times 3 and appears comfortable  Psychiatric: Normal  HEENT: Trachea Midline, No torticollis  Pulmonary: No audible wheezing or strider  Cardiovascular: No discernable arrhythmia   Skin: No masses, erythema, lacerations, fluctation, ulcerations  Neurovascular: Sensation Intact to the Median, Ulnar, Radial Nerve, Motor Intact to the Median, Ulnar, Radial Nerve and Pulses Intact    MUSCULOSKELETAL EXAMINATION:  right long finger:  Positive palpable nodule over the A1 pulley  Positive tenderness to palpation over A1 pulley  Positive clicking  Positive catching        ___________________________________________________  STUDIES REVIEWED:  No studies reviewed  PROCEDURES PERFORMED:  Procedures  No Procedures performed today    _____________________________________________________  ASSESSMENT/PLAN:  Right long trigger finger -continued clicking and locking after CSI   Trigger finger release  was discussed at length today including the risks and benefits, Pt would like to proceed with the surgery                 *Surgery- right long trigger finger release                 * detailed consent was signed                 * anesthesia- local only                 * PT/OT was ordered for wound care                 * Post op medication was sent to pharmacy on file    Surgery medication instructions: You will stop eating and drinking at midnight the night before your surgery, but you may continue to take your normal medications with a small sip of water  In the morning on the day of your surgery, we would like you to take the following medications (as long as you have never been told to avoid Tylenol or NSAIDs like ibuprofen, Naproxen, Aleve, Advil, etc):   Ibuprofen 600mg one tablet by mouth   Tylenol 500mg one tablet by mouth    After surgery, we would like you to take Ibuprofen 600mg one tablet by mouth every 6 hours with food (at breakfast, lunch and dinner)  AND Tylenol 500 mg one tablet by mouth every 6 hours  (at breakfast, lunch and dinner) for 5-7 days after your surgery    Please take these medication EVERYDAY after surgery for 5-7 days, and not just as needed  You can take these medications at the same time  Taking these medications after surgery will limit your need for prescription pain medication  We will also prescribe a narcotic pain medication for a limited time after surgery that you can take as needed for moderate or severe pain  Diagnoses and all orders for this visit:    Trigger middle finger of right hand  -     Ambulatory referral to PT/OT hand therapy  -     Case request operating room: RELEASE TRIGGER FINGER right long; Standing  -     HYDROcodone-acetaminophen (NORCO) 5-325 mg per tablet; Take 1 tablet by mouth every 6 (six) hours as needed for pain for up to 10 days To be taken after surgeryMax Daily Amount: 4 tablets  -     Case request operating room: RELEASE TRIGGER FINGER right long    Other orders  -     Diet NPO; Sips with meds; Standing  -     Height and weight upon arrival; Standing  -     Void on call to OR; Standing  -     lidocaine-epinephrine (XYLOCAINE/EPINEPHRINE) 1 %-1:100,000 injection 10 mL  -     sodium bicarbonate 8 4 % injection 25 mEq        Follow Up:  Return for after surgery  To Do Next Visit:  Re-evaluation of current issue      Operative Discussions:  Trigger Finger Release: The anatomy and physiology of trigger finger was discussed with the patient today in the office  Edema and increased contact pressure within the flexor tendons at the A1 pulley can cause pain, crepitation, and limitation of function  Treatment options include resting MP blocking splints to decrease edema, oral anti-inflammatory medications, home or formal therapy exercises, up to 2 steroid injections or surgical release  While majority of patients do respond to conservative treatment, up to 20% may require surgical release  The patient has elected release of the trigger finger  The patient has elected to undergo a release of the A1 pulley (trigger finger)  A small incision will be made over the palmar aspect of the hand, the tendon sheath holding the flexor tendons will be released  In the postoperative period, light activities are allowed immediately, driving is allowed when narcotic medication has stopped, and the incision may get wet after 2 days  Heavy activities (lifting more than approximately 10 pounds) will be allowed after the follow up appointment in 1-2 weeks  While the pain and discomfort within the wrist typically improves rapidly, some residual discomfort may be present for up to 6 weeks  The nodule that is typically palpable in the palmar aspect of the hand will not be removed, as this would necessitate removal of a portion of the flexor tendon, however the catching, clicking, and locking should resolve  Approximate success rate is 98%  The risks and benefits of the procedure were explained to the patient, which include, but are not limited to: Bleeding, infection, recurrence, pain, scar, damage to tendons, damage to nerves, and damage to blood vessels, need for future surgery and complications related to anesthesia  If bony work is done, risks also include malunion and nonunion  These risks, along with alternative conservative treatment options, and postoperative protocols were voiced back and understood by the patient  All questions were answered to the patient's satisfaction  The patient agrees to comply with a standard postoperative protocol, and is willing to proceed  Education was provided via written and auditory forms  There were no barriers to learning  Written handouts regarding wound care, incision and scar care, and general preoperative information, as well as risks and benefits were provided to the patient      Scribe Attestation    I,:   Kayce Ellington am acting as a scribe while in the presence of the attending physician :        I,:   Cindy Fallon MD personally performed the services described in this documentation    as scribed in my presence :

## 2020-04-21 ENCOUNTER — HOSPITAL ENCOUNTER (EMERGENCY)
Facility: HOSPITAL | Age: 45
Discharge: HOME | End: 2020-04-21
Attending: EMERGENCY MEDICINE | Admitting: EMERGENCY MEDICINE
Payer: MEDICARE

## 2020-04-21 ENCOUNTER — APPOINTMENT (EMERGENCY)
Dept: RADIOLOGY | Facility: HOSPITAL | Age: 45
End: 2020-04-21
Payer: MEDICARE

## 2020-04-21 VITALS
HEART RATE: 82 BPM | SYSTOLIC BLOOD PRESSURE: 123 MMHG | TEMPERATURE: 98 F | WEIGHT: 202 LBS | HEIGHT: 70 IN | RESPIRATION RATE: 18 BRPM | OXYGEN SATURATION: 97 % | DIASTOLIC BLOOD PRESSURE: 74 MMHG | BODY MASS INDEX: 28.92 KG/M2

## 2020-04-21 DIAGNOSIS — L08.9: Primary | ICD-10-CM

## 2020-04-21 DIAGNOSIS — S60.311A: Primary | ICD-10-CM

## 2020-04-21 PROCEDURE — 99283 EMERGENCY DEPT VISIT LOW MDM: CPT

## 2020-04-21 PROCEDURE — 73140 X-RAY EXAM OF FINGER(S): CPT

## 2020-04-21 PROCEDURE — 99284 EMERGENCY DEPT VISIT MOD MDM: CPT | Performed by: PHYSICIAN ASSISTANT

## 2020-04-21 RX ORDER — CEPHALEXIN 250 MG/1
500 CAPSULE ORAL ONCE
Status: COMPLETED | OUTPATIENT
Start: 2020-04-21 | End: 2020-04-21

## 2020-04-21 RX ORDER — CEPHALEXIN 500 MG/1
500 CAPSULE ORAL EVERY 6 HOURS SCHEDULED
Qty: 40 CAPSULE | Refills: 0 | Status: SHIPPED | OUTPATIENT
Start: 2020-04-21 | End: 2020-05-01

## 2020-04-21 RX ADMIN — CEPHALEXIN 500 MG: 250 CAPSULE ORAL at 00:57

## 2020-04-23 VITALS
HEIGHT: 70 IN | WEIGHT: 202 LBS | SYSTOLIC BLOOD PRESSURE: 108 MMHG | BODY MASS INDEX: 28.92 KG/M2 | HEART RATE: 97 BPM | DIASTOLIC BLOOD PRESSURE: 75 MMHG

## 2020-04-23 DIAGNOSIS — S60.351A FOREIGN BODY OF RIGHT THUMB, INITIAL ENCOUNTER: Primary | ICD-10-CM

## 2020-04-23 PROCEDURE — 99213 OFFICE O/P EST LOW 20 MIN: CPT | Performed by: ORTHOPAEDIC SURGERY

## 2020-04-23 PROCEDURE — 1036F TOBACCO NON-USER: CPT | Performed by: ORTHOPAEDIC SURGERY

## 2020-04-23 PROCEDURE — 3008F BODY MASS INDEX DOCD: CPT | Performed by: ORTHOPAEDIC SURGERY

## 2020-10-16 ENCOUNTER — TELEPHONE (OUTPATIENT)
Dept: UROLOGY | Facility: CLINIC | Age: 45
End: 2020-10-16

## 2020-10-16 ENCOUNTER — OFFICE VISIT (OUTPATIENT)
Dept: UROLOGY | Facility: CLINIC | Age: 45
End: 2020-10-16
Payer: MEDICARE

## 2020-10-16 VITALS — WEIGHT: 194.8 LBS | HEART RATE: 83 BPM | BODY MASS INDEX: 27.89 KG/M2 | HEIGHT: 70 IN | TEMPERATURE: 97.3 F

## 2020-10-16 DIAGNOSIS — N52.9 ERECTILE DYSFUNCTION, UNSPECIFIED ERECTILE DYSFUNCTION TYPE: Primary | ICD-10-CM

## 2020-10-16 PROCEDURE — 99213 OFFICE O/P EST LOW 20 MIN: CPT | Performed by: PHYSICIAN ASSISTANT

## 2020-10-16 RX ORDER — SILDENAFIL CITRATE 20 MG/1
20 TABLET ORAL DAILY PRN
Qty: 30 TABLET | Refills: 3 | Status: SHIPPED | OUTPATIENT
Start: 2020-10-16 | End: 2021-05-17 | Stop reason: CLARIF

## 2021-05-17 ENCOUNTER — APPOINTMENT (OUTPATIENT)
Dept: LAB | Facility: CLINIC | Age: 46
End: 2021-05-17
Payer: MEDICARE

## 2021-05-17 ENCOUNTER — OFFICE VISIT (OUTPATIENT)
Dept: INTERNAL MEDICINE CLINIC | Facility: CLINIC | Age: 46
End: 2021-05-17
Payer: MEDICARE

## 2021-05-17 VITALS
SYSTOLIC BLOOD PRESSURE: 112 MMHG | DIASTOLIC BLOOD PRESSURE: 80 MMHG | HEART RATE: 78 BPM | BODY MASS INDEX: 27.15 KG/M2 | TEMPERATURE: 98.5 F | WEIGHT: 189.2 LBS | OXYGEN SATURATION: 98 %

## 2021-05-17 DIAGNOSIS — K27.9 PEPTIC ULCER DISEASE: ICD-10-CM

## 2021-05-17 DIAGNOSIS — Z13.6 SCREENING FOR CARDIOVASCULAR CONDITION: ICD-10-CM

## 2021-05-17 DIAGNOSIS — Z11.4 SCREENING FOR HIV (HUMAN IMMUNODEFICIENCY VIRUS): ICD-10-CM

## 2021-05-17 DIAGNOSIS — Z00.00 MEDICARE ANNUAL WELLNESS VISIT, INITIAL: Primary | ICD-10-CM

## 2021-05-17 PROBLEM — L08.9: Status: RESOLVED | Noted: 2020-04-21 | Resolved: 2021-05-17

## 2021-05-17 PROBLEM — K92.1 HEMATOCHEZIA: Status: RESOLVED | Noted: 2017-06-23 | Resolved: 2021-05-17

## 2021-05-17 PROBLEM — S60.311A: Status: RESOLVED | Noted: 2020-04-21 | Resolved: 2021-05-17

## 2021-05-17 PROBLEM — M65.331 TRIGGER MIDDLE FINGER OF RIGHT HAND: Status: RESOLVED | Noted: 2020-02-27 | Resolved: 2021-05-17

## 2021-05-17 LAB
ALBUMIN SERPL BCP-MCNC: 3.8 G/DL (ref 3.5–5)
ALP SERPL-CCNC: 93 U/L (ref 46–116)
ALT SERPL W P-5'-P-CCNC: 24 U/L (ref 12–78)
ANION GAP SERPL CALCULATED.3IONS-SCNC: 1 MMOL/L (ref 4–13)
AST SERPL W P-5'-P-CCNC: 16 U/L (ref 5–45)
BILIRUB SERPL-MCNC: 0.51 MG/DL (ref 0.2–1)
BUN SERPL-MCNC: 18 MG/DL (ref 5–25)
CALCIUM SERPL-MCNC: 9.2 MG/DL (ref 8.3–10.1)
CHLORIDE SERPL-SCNC: 106 MMOL/L (ref 100–108)
CHOLEST SERPL-MCNC: 118 MG/DL (ref 50–200)
CO2 SERPL-SCNC: 29 MMOL/L (ref 21–32)
CREAT SERPL-MCNC: 1.09 MG/DL (ref 0.6–1.3)
ERYTHROCYTE [DISTWIDTH] IN BLOOD BY AUTOMATED COUNT: 12.7 % (ref 11.6–15.1)
GFR SERPL CREATININE-BSD FRML MDRD: 80 ML/MIN/1.73SQ M
GLUCOSE P FAST SERPL-MCNC: 105 MG/DL (ref 65–99)
HCT VFR BLD AUTO: 47.5 % (ref 36.5–49.3)
HDLC SERPL-MCNC: 40 MG/DL
HGB BLD-MCNC: 15.9 G/DL (ref 12–17)
LDLC SERPL CALC-MCNC: 69 MG/DL (ref 0–100)
MCH RBC QN AUTO: 30.8 PG (ref 26.8–34.3)
MCHC RBC AUTO-ENTMCNC: 33.5 G/DL (ref 31.4–37.4)
MCV RBC AUTO: 92 FL (ref 82–98)
NONHDLC SERPL-MCNC: 78 MG/DL
PLATELET # BLD AUTO: 114 THOUSANDS/UL (ref 149–390)
PMV BLD AUTO: 12.5 FL (ref 8.9–12.7)
POTASSIUM SERPL-SCNC: 4.3 MMOL/L (ref 3.5–5.3)
PROT SERPL-MCNC: 7.5 G/DL (ref 6.4–8.2)
RBC # BLD AUTO: 5.16 MILLION/UL (ref 3.88–5.62)
SODIUM SERPL-SCNC: 136 MMOL/L (ref 136–145)
TRIGL SERPL-MCNC: 45 MG/DL
WBC # BLD AUTO: 7.44 THOUSAND/UL (ref 4.31–10.16)

## 2021-05-17 PROCEDURE — G0438 PPPS, INITIAL VISIT: HCPCS | Performed by: INTERNAL MEDICINE

## 2021-05-17 PROCEDURE — 99214 OFFICE O/P EST MOD 30 MIN: CPT | Performed by: INTERNAL MEDICINE

## 2021-05-17 PROCEDURE — 36415 COLL VENOUS BLD VENIPUNCTURE: CPT

## 2021-05-17 PROCEDURE — 87389 HIV-1 AG W/HIV-1&-2 AB AG IA: CPT

## 2021-05-17 PROCEDURE — 85027 COMPLETE CBC AUTOMATED: CPT

## 2021-05-17 PROCEDURE — 80053 COMPREHEN METABOLIC PANEL: CPT

## 2021-05-17 PROCEDURE — 80061 LIPID PANEL: CPT

## 2021-05-17 NOTE — PATIENT INSTRUCTIONS

## 2021-05-17 NOTE — PROGRESS NOTES
Assessment and Plan:     Problem List Items Addressed This Visit     None      Visit Diagnoses     Medicare annual wellness visit, initial    -  Primary    Peptic ulcer disease        Relevant Orders    CBC    Comprehensive metabolic panel    Screening for cardiovascular condition        Relevant Orders    Lipid panel    Screening for HIV (human immunodeficiency virus)        Relevant Orders    HIV 1/2 Antigen/Antibody (4th Generation) w Reflex [facility]        Check UTD lab work  Otherwise, healthy adult male  BMI Counseling: Body mass index is 27 15 kg/m²  The BMI is above normal  Nutrition recommendations include decreasing portion sizes, encouraging healthy choices of fruits and vegetables, limiting drinks that contain sugar, moderation in carbohydrate intake and increasing intake of lean protein  Exercise recommendations include exercising 3-5 times per week  Preventive health issues were discussed with patient, and age appropriate screening tests were ordered as noted in patient's After Visit Summary  Personalized health advice and appropriate referrals for health education or preventive services given if needed, as noted in patient's After Visit Summary       History of Present Illness:     Patient presents for Medicare Annual Wellness visit    Patient Care Team:  Meghan Gallagher DO as PCP - General     Problem List:     Patient Active Problem List   Diagnosis    Complex regional pain syndrome type 1 of left upper extremity      Past Medical and Surgical History:     Past Medical History:   Diagnosis Date    Abrasion of thumb, infected, right, initial encounter 4/21/2020    Arthritis     Arthrosis of left acromioclavicular joint 10/24/2017    Chronic pain     left shoulder    CTS (carpal tunnel syndrome)     B/L    GERD (gastroesophageal reflux disease)     Helicobacter pylori (H  pylori) infection 7/13/2017    Hematochezia     Impingement syndrome of left shoulder 1/25/2018    Incomplete tear of left rotator cuff 2017    Insomnia     Peripheral neuropathy     left    Superior glenoid labrum lesion of left shoulder 2017    Trigger middle finger of right hand 2020    Added automatically from request for surgery [id number]     Past Surgical History:   Procedure Laterality Date    FOOT SURGERY Bilateral     HAND SURGERY Bilateral     MI SHLDR ARTHROSCOP,SURG,CAPSULORRHAPHY Left 2018    Procedure: LEFT SHOULDER ARTHROSCOPIC SLAP REPAIR, ROTATOR CUFF DEBRIDEMENT, SUBACROMIAL DECOMPRESSION, DISTAL CLAVICLE EXCISION;  Surgeon: Niki Sampson MD;  [facility];  Service: Orthopedics    TOOTH EXTRACTION      WRIST SURGERY        Family History:     Family History   Problem Relation Age of Onset    Diabetes Mother     Arthritis Father     Cancer Maternal Grandmother       Social History:     E-Cigarette/Vaping    E-Cigarette Use Never User      E-Cigarette/Vaping Substances    Nicotine No     THC No     CBD No     Flavoring No     Other No     Unknown No      Social History     Socioeconomic History    Marital status: /Civil Union     Spouse name: None    Number of children: None    Years of education: None    Highest education level: None   Occupational History    None   Social Needs    Financial resource strain: None    Food insecurity     Worry: None     Inability: None    Transportation needs     Medical: None     Non-medical: None   Tobacco Use    Smoking status: Former Smoker     Packs/day: 0 50     Years: 5 00     Pack years: 2 50     Types: Cigarettes     Quit date:      Years since quittin 3    Smokeless tobacco: Never Used   Substance and Sexual Activity    Alcohol use: Yes     Frequency: Monthly or less     Drinks per session: 1 or 2     Binge frequency: Never     Comment: on occasion    Drug use: Yes     Types: Marijuana    Sexual activity: Yes     Partners: Female   Lifestyle    Physical activity     Days per week: 3 days Minutes per session: 30 min    Stress: Not at all   Relationships    Social connections     Talks on phone: None     Gets together: None     Attends L[employer] service: None     Active member of club or organization: None     Attends meetings of clubs or organizations: None     Relationship status: None    Intimate partner violence     Fear of current or ex partner: None     Emotionally abused: None     Physically abused: None     Forced sexual activity: None   Other Topics Concern    None   Social History Narrative    None      Medications and Allergies:     No current outpatient medications on file  No current facility-administered medications for this visit  Allergies   Allergen Reactions    Penicillins Other (See Comments)     Bleeding from nose, mouth, vomiting      Immunizations: There is no immunization history for the selected administration types on file for this patient  Health Maintenance:         Topic Date Due    HIV Screening  Never done         Topic Date Due    COVID-19 Vaccine (1) Never done    DTaP,Tdap,and Td Vaccines (1 - Tdap) Never done      Medicare Health Risk Assessment:     /80 (BP Location: Left arm, Patient Position: Sitting, Cuff Size: Standard)   Pulse 78   Temp 98 5 °F (36 9 °C) (Temporal) Comment: NO NSAIDS  Wt 85 8 kg (189 lb 3 2 oz) Comment: w/ shoes denied off  SpO2 98%   BMI 27 15 kg/m²      Scarlett Temple is here for his Initial Wellness visit  Health Risk Assessment:   Patient rates overall health as good  Patient feels that their physical health rating is slightly better  Patient is very satisfied with their life  Eyesight was rated as slightly worse  Hearing was rated as slightly worse  Patient feels that their emotional and mental health rating is same  Patients states they are sometimes angry  Patient states they are never, rarely unusually tired/fatigued  Pain experienced in the last 7 days has been none   Patient states that he has experienced weight loss or gain in last 6 months  Depression Screening:   PHQ-2 Score: 0      Fall Risk Screening: In the past year, patient has experienced: history of falling in past year    Number of falls: 1  Injured during fall?: Yes    Feels unsteady when standing or walking?: No    Worried about falling?: No      Home Safety:  Patient does not have trouble with stairs inside or outside of their home  Patient has working smoke alarms and has working carbon monoxide detector  Home safety hazards include: none  Nutrition:   Current diet is Other (please comment)  Eat once a day      Medications:   Patient is currently taking over-the-counter supplements  OTC medications include: see medication list  Patient is able to manage medications  Activities of Daily Living (ADLs)/Instrumental Activities of Daily Living (IADLs):   Walk and transfer into and out of bed and chair?: Yes  Dress and groom yourself?: Yes    Bathe or shower yourself?: Yes    Feed yourself?  Yes  Do your laundry/housekeeping?: Yes  Manage your money, pay your bills and track your expenses?: Yes  Make your own meals?: Yes    Do your own shopping?: Yes    Durable Medical Equipment Suppliers  none    Previous Hospitalizations:   Any hospitalizations or ED visits within the last 12 months?: No      Advance Care Planning:   Living will: No    Durable POA for healthcare: No    Advanced directive: No    Five wishes given: Yes      Cognitive Screening:   Provider or family/friend/caregiver concerned regarding cognition?: No    PREVENTIVE SCREENINGS      Cardiovascular Screening:    General: Screening Current      Diabetes Screening:     General: Screening Current      Colorectal Cancer Screening:     General: Screening Not Indicated      Prostate Cancer Screening:    General: Screening Not Indicated      Osteoporosis Screening:    General: Screening Not Indicated      Abdominal Aortic Aneurysm (AAA) Screening:    Risk factors include: tobacco use        General: Screening Not Indicated      Lung Cancer Screening:     General: Screening Not Indicated      Hepatitis C Screening:    General: Screening Not Indicated    Screening, Brief Intervention, and Referral to Treatment (SBIRT)    Screening  Typical number of drinks in a day: 0  Typical number of drinks in a week: 0  Interpretation: Low risk drinking behavior  AUDIT-C Screenin) How often did you have a drink containing alcohol in the past year? monthly or less  2) How many drinks did you have on a typical day when you were drinking in the past year? 1 to 2  3) How often did you have 6 or more drinks on one occasion in the past year? never    AUDIT-C Score: 1  Interpretation: Score 0-3 (male): Negative screen for alcohol misuse    Single Item Drug Screening:  How often have you used an illegal drug (including marijuana) or a prescription medication for non-medical reasons in the past year? never    Single Item Drug Screen Score: 0  Interpretation: Negative screen for possible drug use disorder    Brief Intervention  Alcohol & drug use screenings were reviewed  No concerns regarding substance use disorder identified  Other Counseling Topics:   Car/seat belt/driving safety, skin self-exam, sunscreen and regular weightbearing exercise         Stan Garza,

## 2021-05-18 LAB — HIV 1+2 AB+HIV1 P24 AG SERPL QL IA: NORMAL

## 2021-05-19 ENCOUNTER — TELEPHONE (OUTPATIENT)
Dept: INTERNAL MEDICINE CLINIC | Facility: CLINIC | Age: 46
End: 2021-05-19

## 2021-05-19 NOTE — TELEPHONE ENCOUNTER
----- Message from Nina Trejo DO sent at 5/19/2021  8:49 AM EDT -----  Call patient and let him know there were no significant abnormalities on lab work

## 2022-04-27 ENCOUNTER — RA CDI HCC (OUTPATIENT)
Dept: OTHER | Facility: HOSPITAL | Age: 47
End: 2022-04-27

## 2022-04-27 NOTE — PROGRESS NOTES
[address]  coding opportunities       Chart reviewed, no opportunity found: CHART REVIEWED, NO OPPORTUNITY FOUND        Patients Insurance     Medicare Insurance: Medicare

## 2023-02-02 ENCOUNTER — TELEPHONE (OUTPATIENT)
Dept: INTERNAL MEDICINE CLINIC | Facility: CLINIC | Age: 48
End: 2023-02-02

## 2023-02-02 DIAGNOSIS — M25.421 ELBOW SWELLING, RIGHT: Primary | ICD-10-CM

## 2023-02-02 NOTE — TELEPHONE ENCOUNTER
Patient needs a dr order for orthopedic dr    Dr Julian Grover in [facility]  Has an appt today at 1pm    Right elbow swelling with fluid    I told patient that he needed an appt to get this, since he has not been seen in 1yr, states he has the appt today and can't come in    Fax # [phone]

## (undated) DEVICE — INTENDED FOR TISSUE SEPARATION, AND OTHER PROCEDURES THAT REQUIRE A SHARP SURGICAL BLADE TO PUNCTURE OR CUT.: Brand: BARD-PARKER ® CARBON RIB-BACK BLADES

## (undated) DEVICE — ARTHROSCOPY FLOOR MAT

## (undated) DEVICE — TUBING SUCTION 5MM X 12 FT

## (undated) DEVICE — IMPERVIOUS STOCKINETTE: Brand: DEROYAL

## (undated) DEVICE — LIGHT HANDLE COVER SLEEVE DISP BLUE STELLAR

## (undated) DEVICE — NEEDLE SPINAL18G X 3.5 IN QUINCKE

## (undated) DEVICE — EXPRESSEW III SUTURE NEEDLE FOR USE WITH EXPRESSEW II OR III SUTURE PASSER: Brand: EXPRESSEW

## (undated) DEVICE — 3M™ MICROFOAM™ SURGICAL TAPE 4 ROLLS/CARTON 6 CARTONS/CASE 1528-3: Brand: 3M™ MICROFOAM™

## (undated) DEVICE — SUT PDS II 1 CT-1 27 IN Z347H

## (undated) DEVICE — GLOVE SRG BIOGEL 8.5

## (undated) DEVICE — GLOVE INDICATOR PI UNDERGLOVE SZ 8.5 BLUE

## (undated) DEVICE — BURR  OVAL 4MM 13CM 8 FLUTE COOLCUT

## (undated) DEVICE — VAPR COOLPULSE 90 ELECTRODE 90 DEGREES SUCTION WITH INTEGRATED HANDPIECE: Brand: VAPR COOLPULSE

## (undated) DEVICE — THREADED CLEAR CANNULA WITH OBTURATOR 7MM X 75MM

## (undated) DEVICE — ABDOMINAL PAD: Brand: DERMACEA

## (undated) DEVICE — POSITIONER BEACH CHAIR FOAM KIT

## (undated) DEVICE — ALL PURPOSE SPONGES,NON-WOVEN, 4 PLY: Brand: CURITY

## (undated) DEVICE — CHLORAPREP HI-LITE 26ML ORANGE

## (undated) DEVICE — PUDDLE VAC

## (undated) DEVICE — PACK MAJOR ORTHO W/SPLITS PBDS

## (undated) DEVICE — SUT MONOCRYL 3-0 PS-2 18 IN Y497G

## (undated) DEVICE — 3M™ STERI-STRIP™ REINFORCED ADHESIVE SKIN CLOSURES, R1547, 1/2 IN X 4 IN (12 MM X 100 MM), 6 STRIPS/ENVELOPE: Brand: 3M™ STERI-STRIP™

## (undated) DEVICE — SHOULDER STABILIZATION KIT,                                    DISPOSABLE 12 PER BOX

## (undated) DEVICE — BLADE SHAVER CUTTER BN 4MM 13CM COOLCUT

## (undated) DEVICE — TUBING ARTHROSCOPY REDUCE PATIENT